# Patient Record
Sex: FEMALE | Race: BLACK OR AFRICAN AMERICAN | NOT HISPANIC OR LATINO | URBAN - METROPOLITAN AREA
[De-identification: names, ages, dates, MRNs, and addresses within clinical notes are randomized per-mention and may not be internally consistent; named-entity substitution may affect disease eponyms.]

---

## 2019-05-03 ENCOUNTER — INPATIENT (INPATIENT)
Facility: HOSPITAL | Age: 44
LOS: 1 days | Discharge: HOME | End: 2019-05-05
Attending: INTERNAL MEDICINE | Admitting: INTERNAL MEDICINE
Payer: COMMERCIAL

## 2019-05-03 VITALS
RESPIRATION RATE: 16 BRPM | SYSTOLIC BLOOD PRESSURE: 123 MMHG | HEART RATE: 73 BPM | DIASTOLIC BLOOD PRESSURE: 69 MMHG | TEMPERATURE: 98 F | OXYGEN SATURATION: 99 %

## 2019-05-03 DIAGNOSIS — Z09 ENCOUNTER FOR FOLLOW-UP EXAMINATION AFTER COMPLETED TREATMENT FOR CONDITIONS OTHER THAN MALIGNANT NEOPLASM: Chronic | ICD-10-CM

## 2019-05-03 DIAGNOSIS — F17.200 NICOTINE DEPENDENCE, UNSPECIFIED, UNCOMPLICATED: ICD-10-CM

## 2019-05-03 DIAGNOSIS — R07.9 CHEST PAIN, UNSPECIFIED: ICD-10-CM

## 2019-05-03 DIAGNOSIS — Z29.9 ENCOUNTER FOR PROPHYLACTIC MEASURES, UNSPECIFIED: ICD-10-CM

## 2019-05-03 DIAGNOSIS — Z98.89 OTHER SPECIFIED POSTPROCEDURAL STATES: Chronic | ICD-10-CM

## 2019-05-03 DIAGNOSIS — F31.9 BIPOLAR DISORDER, UNSPECIFIED: ICD-10-CM

## 2019-05-03 LAB
ALBUMIN SERPL ELPH-MCNC: 4.5 G/DL — SIGNIFICANT CHANGE UP (ref 3.3–5)
ALP SERPL-CCNC: 82 U/L — SIGNIFICANT CHANGE UP (ref 40–120)
ALT FLD-CCNC: 19 U/L — SIGNIFICANT CHANGE UP (ref 4–33)
ANION GAP SERPL CALC-SCNC: 14 MMO/L — SIGNIFICANT CHANGE UP (ref 7–14)
APTT BLD: 33.3 SEC — SIGNIFICANT CHANGE UP (ref 27.5–36.3)
AST SERPL-CCNC: 19 U/L — SIGNIFICANT CHANGE UP (ref 4–32)
BASOPHILS # BLD AUTO: 0.03 K/UL — SIGNIFICANT CHANGE UP (ref 0–0.2)
BASOPHILS NFR BLD AUTO: 0.4 % — SIGNIFICANT CHANGE UP (ref 0–2)
BILIRUB SERPL-MCNC: 0.3 MG/DL — SIGNIFICANT CHANGE UP (ref 0.2–1.2)
BUN SERPL-MCNC: 10 MG/DL — SIGNIFICANT CHANGE UP (ref 7–23)
CALCIUM SERPL-MCNC: 9.6 MG/DL — SIGNIFICANT CHANGE UP (ref 8.4–10.5)
CHLORIDE SERPL-SCNC: 102 MMOL/L — SIGNIFICANT CHANGE UP (ref 98–107)
CK MB BLD-MCNC: < 1 NG/ML — LOW (ref 1–4.7)
CK MB BLD-MCNC: SIGNIFICANT CHANGE UP (ref 0–2.5)
CK SERPL-CCNC: 90 U/L — SIGNIFICANT CHANGE UP (ref 25–170)
CO2 SERPL-SCNC: 23 MMOL/L — SIGNIFICANT CHANGE UP (ref 22–31)
CREAT SERPL-MCNC: 0.65 MG/DL — SIGNIFICANT CHANGE UP (ref 0.5–1.3)
EOSINOPHIL # BLD AUTO: 0.06 K/UL — SIGNIFICANT CHANGE UP (ref 0–0.5)
EOSINOPHIL NFR BLD AUTO: 0.7 % — SIGNIFICANT CHANGE UP (ref 0–6)
GLUCOSE SERPL-MCNC: 98 MG/DL — SIGNIFICANT CHANGE UP (ref 70–99)
HCT VFR BLD CALC: 36.4 % — SIGNIFICANT CHANGE UP (ref 34.5–45)
HGB BLD-MCNC: 12.3 G/DL — SIGNIFICANT CHANGE UP (ref 11.5–15.5)
IMM GRANULOCYTES NFR BLD AUTO: 0.2 % — SIGNIFICANT CHANGE UP (ref 0–1.5)
INR BLD: 1.05 — SIGNIFICANT CHANGE UP (ref 0.88–1.17)
LYMPHOCYTES # BLD AUTO: 2.18 K/UL — SIGNIFICANT CHANGE UP (ref 1–3.3)
LYMPHOCYTES # BLD AUTO: 26.6 % — SIGNIFICANT CHANGE UP (ref 13–44)
MAGNESIUM SERPL-MCNC: 1.9 MG/DL — SIGNIFICANT CHANGE UP (ref 1.6–2.6)
MCHC RBC-ENTMCNC: 30.8 PG — SIGNIFICANT CHANGE UP (ref 27–34)
MCHC RBC-ENTMCNC: 33.8 % — SIGNIFICANT CHANGE UP (ref 32–36)
MCV RBC AUTO: 91.2 FL — SIGNIFICANT CHANGE UP (ref 80–100)
MONOCYTES # BLD AUTO: 0.54 K/UL — SIGNIFICANT CHANGE UP (ref 0–0.9)
MONOCYTES NFR BLD AUTO: 6.6 % — SIGNIFICANT CHANGE UP (ref 2–14)
NEUTROPHILS # BLD AUTO: 5.38 K/UL — SIGNIFICANT CHANGE UP (ref 1.8–7.4)
NEUTROPHILS NFR BLD AUTO: 65.5 % — SIGNIFICANT CHANGE UP (ref 43–77)
NRBC # FLD: 0 K/UL — SIGNIFICANT CHANGE UP (ref 0–0)
PHOSPHATE SERPL-MCNC: 3.6 MG/DL — SIGNIFICANT CHANGE UP (ref 2.5–4.5)
PLATELET # BLD AUTO: 280 K/UL — SIGNIFICANT CHANGE UP (ref 150–400)
PMV BLD: 9.2 FL — SIGNIFICANT CHANGE UP (ref 7–13)
POTASSIUM SERPL-MCNC: 4.4 MMOL/L — SIGNIFICANT CHANGE UP (ref 3.5–5.3)
POTASSIUM SERPL-SCNC: 4.4 MMOL/L — SIGNIFICANT CHANGE UP (ref 3.5–5.3)
PROT SERPL-MCNC: 7.9 G/DL — SIGNIFICANT CHANGE UP (ref 6–8.3)
PROTHROM AB SERPL-ACNC: 11.7 SEC — SIGNIFICANT CHANGE UP (ref 9.8–13.1)
RBC # BLD: 3.99 M/UL — SIGNIFICANT CHANGE UP (ref 3.8–5.2)
RBC # FLD: 12.4 % — SIGNIFICANT CHANGE UP (ref 10.3–14.5)
SODIUM SERPL-SCNC: 139 MMOL/L — SIGNIFICANT CHANGE UP (ref 135–145)
TROPONIN T, HIGH SENSITIVITY: < 6 NG/L — SIGNIFICANT CHANGE UP (ref ?–14)
TROPONIN T, HIGH SENSITIVITY: < 6 NG/L — SIGNIFICANT CHANGE UP (ref ?–14)
TSH SERPL-MCNC: 0.91 UIU/ML — SIGNIFICANT CHANGE UP (ref 0.27–4.2)
WBC # BLD: 8.21 K/UL — SIGNIFICANT CHANGE UP (ref 3.8–10.5)
WBC # FLD AUTO: 8.21 K/UL — SIGNIFICANT CHANGE UP (ref 3.8–10.5)

## 2019-05-03 PROCEDURE — 71046 X-RAY EXAM CHEST 2 VIEWS: CPT | Mod: 26

## 2019-05-03 RX ORDER — ASPIRIN/CALCIUM CARB/MAGNESIUM 324 MG
162 TABLET ORAL ONCE
Qty: 0 | Refills: 0 | Status: COMPLETED | OUTPATIENT
Start: 2019-05-03 | End: 2019-05-03

## 2019-05-03 RX ORDER — ASPIRIN/CALCIUM CARB/MAGNESIUM 324 MG
81 TABLET ORAL DAILY
Qty: 0 | Refills: 0 | Status: DISCONTINUED | OUTPATIENT
Start: 2019-05-04 | End: 2019-05-05

## 2019-05-03 RX ORDER — SODIUM CHLORIDE 9 MG/ML
1000 INJECTION, SOLUTION INTRAVENOUS ONCE
Qty: 0 | Refills: 0 | Status: COMPLETED | OUTPATIENT
Start: 2019-05-03 | End: 2019-05-03

## 2019-05-03 RX ORDER — HEPARIN SODIUM 5000 [USP'U]/ML
5000 INJECTION INTRAVENOUS; SUBCUTANEOUS EVERY 12 HOURS
Qty: 0 | Refills: 0 | Status: DISCONTINUED | OUTPATIENT
Start: 2019-05-03 | End: 2019-05-05

## 2019-05-03 RX ORDER — LAMOTRIGINE 25 MG/1
125 TABLET, ORALLY DISINTEGRATING ORAL
Qty: 0 | Refills: 0 | Status: DISCONTINUED | OUTPATIENT
Start: 2019-05-03 | End: 2019-05-03

## 2019-05-03 RX ORDER — INFLUENZA VIRUS VACCINE 15; 15; 15; 15 UG/.5ML; UG/.5ML; UG/.5ML; UG/.5ML
0.5 SUSPENSION INTRAMUSCULAR ONCE
Qty: 0 | Refills: 0 | Status: DISCONTINUED | OUTPATIENT
Start: 2019-05-03 | End: 2019-05-05

## 2019-05-03 RX ORDER — SODIUM CHLORIDE 9 MG/ML
3 INJECTION INTRAMUSCULAR; INTRAVENOUS; SUBCUTANEOUS EVERY 8 HOURS
Qty: 0 | Refills: 0 | Status: DISCONTINUED | OUTPATIENT
Start: 2019-05-03 | End: 2019-05-05

## 2019-05-03 RX ORDER — LAMOTRIGINE 25 MG/1
250 TABLET, ORALLY DISINTEGRATING ORAL AT BEDTIME
Qty: 0 | Refills: 0 | Status: DISCONTINUED | OUTPATIENT
Start: 2019-05-03 | End: 2019-05-05

## 2019-05-03 RX ADMIN — SODIUM CHLORIDE 3 MILLILITER(S): 9 INJECTION INTRAMUSCULAR; INTRAVENOUS; SUBCUTANEOUS at 21:40

## 2019-05-03 RX ADMIN — Medication 162 MILLIGRAM(S): at 16:03

## 2019-05-03 RX ADMIN — SODIUM CHLORIDE 1000 MILLILITER(S): 9 INJECTION, SOLUTION INTRAVENOUS at 16:03

## 2019-05-03 NOTE — ED ADULT NURSE NOTE - NSIMPLEMENTINTERV_GEN_ALL_ED
Implemented All Fall Risk Interventions:  [place] to call system. Call bell, personal items and telephone within reach. Instruct patient to call for assistance. Room bathroom lighting operational. Non-slip footwear when patient is off stretcher. Physically safe environment: no spills, clutter or unnecessary equipment. Stretcher in lowest position, wheels locked, appropriate side rails in place. Provide visual cue, wrist band, yellow gown, etc. Monitor gait and stability. Monitor for mental status changes and reorient to person, place, and time. Review medications for side effects contributing to fall risk. Reinforce activity limits and safety measures with patient and family.

## 2019-05-03 NOTE — ED PROVIDER NOTE - CLINICAL SUMMARY MEDICAL DECISION MAKING FREE TEXT BOX
43F w/ hx fibroids s/p hysterectomy, bipolar d/o on lamotrigine presents with intermittent left sided chest pain for the last two days. Pre-troponin HEART score 2. Concerning given concomittent presyncope symptoms. Likely dehydration element in this case. Will get cbc, cmp, coags, ekg, cxr, troponin. Will give ASA, Fluids. reassess. may need cdu for stress/echo. Naz att: 43F w/ hx fibroids s/p hysterectomy, bipolar d/o on lamotrigine presents with intermittent left sided chest pain for the last two days. Pre-troponin HEART score 2. Concerning given concomittent presyncope symptoms. Likely dehydration element in this case. Will get cbc, cmp, coags, ekg, cxr, troponin. Will give ASA, Fluids. reassess. may need cdu for stress/echo.

## 2019-05-03 NOTE — ED ADULT NURSE REASSESSMENT NOTE - NS ED NURSE REASSESS COMMENT FT1
Report received from Intake REBECA CREWS Pt A&Ox4, ambulatory, IV intact. Belongings with patient. EKG in chart. Report given to REBECA Schreiber - pt is a CDU boarder at this time.

## 2019-05-03 NOTE — H&P ADULT - PROBLEM SELECTOR PLAN 2
Continue Lamictal. Pt advised of the hazards of continue smoking Nicotine and benefits of quitting.  Pt currently does not want Nicotine supplementing.

## 2019-05-03 NOTE — H&P ADULT - NSHPRISKHIVSCREEN_GEN_ALL_CORE
Offered and patient declined Abdomen soft, nontender, nondistended, bowel sounds present in all 4 quadrants.

## 2019-05-03 NOTE — H&P ADULT - NEUROLOGICAL DETAILS
alert and oriented x 3/responds to verbal commands/no spontaneous movement/normal strength/sensation intact

## 2019-05-03 NOTE — ED PROVIDER NOTE - NS ED ROS FT
Denies fevers, chills, nausea, vomiting, HA, blurry vision, neck pain, back pain, abd pain, diarrhea, constipation, dysuria, hematuria.    [ x] All other systems negative  [ ] Unable to assess ROS because ________.

## 2019-05-03 NOTE — H&P ADULT - RS GEN PE MLT RESP DETAILS PC
airway patent/breath sounds equal/no rales/good air movement/respirations non-labored/no rhonchi/no wheezes/no intercostal retractions/clear to auscultation bilaterally/no chest wall tenderness/no subcutaneous emphysema

## 2019-05-03 NOTE — ED PROVIDER NOTE - PHYSICAL EXAMINATION
General: WN/WD NAD  HEENT: PERRLA, EOMI, moist mucous membranes  Neurology: A&Ox3, nonfocal, NAVARRO x 4  Respiratory: CTA B/L, normal respiratory effort, no wheezes, crackles, rales  CV: RRR, S1S2, no murmurs, rubs or gallops  Abdominal: Soft, NT, ND +BS, Last BM  Extremities: No edema, + peripheral pulses  Incisions: none  Tubes: none

## 2019-05-03 NOTE — H&P ADULT - NSHPLABSRESULTS_GEN_ALL_CORE
H &H = 12.3/ 36.4  PT/ INR / PTT= 11.7/ 1.05/ 33.3  TSH= 0.91  BUN/ Creatinine= 10/ 0.65  Glucose = 98  Trop < 6 , 6  CXR Preliminary = Clear   TSH = 0.91  UCG= Negative  EKG NSR @ 68b/ min ,QT/ QTC= 422/ 448.

## 2019-05-03 NOTE — ED ADULT NURSE NOTE - OBJECTIVE STATEMENT
Pt presenting to intake AxOx4, ambulatory at baseline with c/o left sided intermittent stabbing chest pain x1 day. Pt states she had an episode this AM where she felt like she was going to pass out. Pt denies LOC or falling. On arrival to ED pt asymptomatic. Breathing is even and unlabored, pt satting well on RA. IV established with 20g in LAC, labs drawn and sent, pt on cardiac monitor- NSR @75. MD at bedside, will continue to monitor.

## 2019-05-03 NOTE — H&P ADULT - NEGATIVE OPHTHALMOLOGIC SYMPTOMS
no discharge L/no photophobia/no lacrimation L/no lacrimation R/no blurred vision L/no blurred vision R

## 2019-05-03 NOTE — H&P ADULT - HISTORY OF PRESENT ILLNESS
43F with hx of fibroids s/p hysterectomy, bipolar d/o on lamotrigine presents with intermittent left sided chest pain for the last two days. Pt noticed the pain first on 5/1/19 evening, has been coming and going since then, non exertional, radiates to the L jaw and down the L arm. L arm also with numbness and tingling. The patient also noted today with the last episode that she felt presyncopal and almost collapsed. Never actually had LOC. Denies fevers, chills, nausea, vomiting, HA, blurry vision, neck pain, back pain, abd pain, diarrhea, constipation, dysuria, hematuria.

## 2019-05-03 NOTE — H&P ADULT - NEGATIVE NEUROLOGICAL SYMPTOMS
no transient paralysis/no headache/no facial palsy/no loss of sensation/no focal seizures/no difficulty walking/no loss of consciousness/no hemiparesis/no paresthesias/no confusion/no weakness/no syncope/no tremors/no generalized seizures

## 2019-05-03 NOTE — H&P ADULT - NSICDXPASTMEDICALHX_GEN_ALL_CORE_FT
PAST MEDICAL HISTORY:  Bipolar illness     Fibroids PAST MEDICAL HISTORY:  Bipolar illness     Fibroids     Nicotine addiction

## 2019-05-03 NOTE — ED PROVIDER NOTE - PROGRESS NOTE DETAILS
Alonso CRUM: Labs reassuring. First trop neg. CXR clear. Given syncope with chest pain will need stress and echo. Stress lab not open tomorrow so cannot CDU. D/w tele doc Dr. Ramos who agrees with admission.

## 2019-05-03 NOTE — H&P ADULT - NEGATIVE ENMT SYMPTOMS
no sinus symptoms/no tinnitus/no nasal discharge/no ear pain/no post-nasal discharge/no nasal congestion/no nasal obstruction/no hearing difficulty

## 2019-05-03 NOTE — ED ADULT TRIAGE NOTE - CHIEF COMPLAINT QUOTE
Patient arrives with ems for c/o left sided chest pain on and off since yesterday. Today while at work she got the chest pain and " felt like I was going to pass out" , also has tingling in her left hand. No chest pain at time of triage.

## 2019-05-03 NOTE — ED PROVIDER NOTE - OBJECTIVE STATEMENT
43F w/ hx fibroids s/p hysterectomy, bipolar d/o on lamotrigine presents with intermittent left sided chest pain for the last two days. Pt noticed the pain first on wednesday night. Has been coming and going since then. non exertional. radiates to the L jaw and down the L arm. L arm also with numbness and tingling. The patient also noted today with the last episode that she felt presyncopal and almost collapsed. Never actually had LOC. Denies fevers, chills, nausea, vomiting, HA, blurry vision, neck pain, back pain, abd pain, diarrhea, constipation, dysuria, hematuria. No fam hx of early cardiac death or stroke. Pt is an every day smoker (1-2 cigarettes). Not on OCPs (s/p hysterectomy).

## 2019-05-04 LAB
ANION GAP SERPL CALC-SCNC: 14 MMO/L — SIGNIFICANT CHANGE UP (ref 7–14)
BUN SERPL-MCNC: 14 MG/DL — SIGNIFICANT CHANGE UP (ref 7–23)
CALCIUM SERPL-MCNC: 9 MG/DL — SIGNIFICANT CHANGE UP (ref 8.4–10.5)
CHLORIDE SERPL-SCNC: 103 MMOL/L — SIGNIFICANT CHANGE UP (ref 98–107)
CHOLEST SERPL-MCNC: 191 MG/DL — SIGNIFICANT CHANGE UP (ref 120–199)
CO2 SERPL-SCNC: 22 MMOL/L — SIGNIFICANT CHANGE UP (ref 22–31)
CREAT SERPL-MCNC: 0.69 MG/DL — SIGNIFICANT CHANGE UP (ref 0.5–1.3)
GLUCOSE SERPL-MCNC: 108 MG/DL — HIGH (ref 70–99)
HCT VFR BLD CALC: 34 % — LOW (ref 34.5–45)
HDLC SERPL-MCNC: 59 MG/DL — SIGNIFICANT CHANGE UP (ref 45–65)
HGB BLD-MCNC: 11.2 G/DL — LOW (ref 11.5–15.5)
LIPID PNL WITH DIRECT LDL SERPL: 132 MG/DL — SIGNIFICANT CHANGE UP
MAGNESIUM SERPL-MCNC: 1.9 MG/DL — SIGNIFICANT CHANGE UP (ref 1.6–2.6)
MCHC RBC-ENTMCNC: 30.8 PG — SIGNIFICANT CHANGE UP (ref 27–34)
MCHC RBC-ENTMCNC: 32.9 % — SIGNIFICANT CHANGE UP (ref 32–36)
MCV RBC AUTO: 93.4 FL — SIGNIFICANT CHANGE UP (ref 80–100)
NRBC # FLD: 0 K/UL — SIGNIFICANT CHANGE UP (ref 0–0)
PHOSPHATE SERPL-MCNC: 4.4 MG/DL — SIGNIFICANT CHANGE UP (ref 2.5–4.5)
PLATELET # BLD AUTO: 247 K/UL — SIGNIFICANT CHANGE UP (ref 150–400)
PMV BLD: 9.4 FL — SIGNIFICANT CHANGE UP (ref 7–13)
POTASSIUM SERPL-MCNC: 4 MMOL/L — SIGNIFICANT CHANGE UP (ref 3.5–5.3)
POTASSIUM SERPL-SCNC: 4 MMOL/L — SIGNIFICANT CHANGE UP (ref 3.5–5.3)
RBC # BLD: 3.64 M/UL — LOW (ref 3.8–5.2)
RBC # FLD: 12.4 % — SIGNIFICANT CHANGE UP (ref 10.3–14.5)
SODIUM SERPL-SCNC: 139 MMOL/L — SIGNIFICANT CHANGE UP (ref 135–145)
TRIGL SERPL-MCNC: 74 MG/DL — SIGNIFICANT CHANGE UP (ref 10–149)
WBC # BLD: 6.16 K/UL — SIGNIFICANT CHANGE UP (ref 3.8–10.5)
WBC # FLD AUTO: 6.16 K/UL — SIGNIFICANT CHANGE UP (ref 3.8–10.5)

## 2019-05-04 PROCEDURE — 93010 ELECTROCARDIOGRAM REPORT: CPT

## 2019-05-04 RX ADMIN — SODIUM CHLORIDE 3 MILLILITER(S): 9 INJECTION INTRAMUSCULAR; INTRAVENOUS; SUBCUTANEOUS at 05:58

## 2019-05-04 RX ADMIN — Medication 81 MILLIGRAM(S): at 12:30

## 2019-05-04 RX ADMIN — HEPARIN SODIUM 5000 UNIT(S): 5000 INJECTION INTRAVENOUS; SUBCUTANEOUS at 05:59

## 2019-05-04 RX ADMIN — SODIUM CHLORIDE 3 MILLILITER(S): 9 INJECTION INTRAMUSCULAR; INTRAVENOUS; SUBCUTANEOUS at 12:29

## 2019-05-04 RX ADMIN — SODIUM CHLORIDE 3 MILLILITER(S): 9 INJECTION INTRAMUSCULAR; INTRAVENOUS; SUBCUTANEOUS at 23:06

## 2019-05-04 RX ADMIN — LAMOTRIGINE 250 MILLIGRAM(S): 25 TABLET, ORALLY DISINTEGRATING ORAL at 23:05

## 2019-05-04 RX ADMIN — HEPARIN SODIUM 5000 UNIT(S): 5000 INJECTION INTRAVENOUS; SUBCUTANEOUS at 17:22

## 2019-05-04 RX ADMIN — LAMOTRIGINE 250 MILLIGRAM(S): 25 TABLET, ORALLY DISINTEGRATING ORAL at 00:15

## 2019-05-04 NOTE — CONSULT NOTE ADULT - ASSESSMENT
44 y/o female  withhx tobacco use ohx fibroids  s/p hysterectomy, bipolar d/o admitted with chest pain associated with dizziness and near syncope/l arm pain     . Atypical CP  ECHO  -pending Exercise Nuclear Stress r/o ischemic heart disease   -asa  -smoking cessation      2.  Bipolar D/O  lamoTRIgine to continue     dvt ppx

## 2019-05-04 NOTE — CHART NOTE - NSCHARTNOTEFT_GEN_A_CORE
Called for 43 year old complaining of having an episode of chest pain lasting only a few seconds. Chest pain is described as sharp and states began with deep breathing. Pt states pain was confined to the L side of her chest; no radiation. States no SOB, no N/V, no sweating, and no other symptoms. Admits to a high stress job however no stress more than usual. She is currently asymptomatic.   Vital Signs Last 24 Hrs  T(C): 36.6 (04 May 2019 21:09), Max: 36.8 (04 May 2019 05:57)  T(F): 97.9 (04 May 2019 21:09), Max: 98.2 (04 May 2019 05:57)  HR: 72 (04 May 2019 21:09) (62 - 76)  BP: 115/68 (04 May 2019 21:09) (100/64 - 116/75)  BP(mean): --  RR: 17 (04 May 2019 21:09) (15 - 17)  SpO2: 99% (04 May 2019 21:09) (99% - 100%)  Pt AOx3.   Lungs CTAB; no wheeze, rales, or rhonchi.   Cardiac S1 S2. RRR  No neuro deficits    MEDICATIONS  (STANDING):  aspirin enteric coated 81 milliGRAM(s) Oral daily  heparin  Injectable 5000 Unit(s) SubCutaneous every 12 hours  influenza   Vaccine 0.5 milliLiter(s) IntraMuscular once  lamoTRIgine  milliGRAM(s) Oral at bedtime  sodium chloride 0.9% lock flush 3 milliLiter(s) IV Push every 8 hours      EKG NSR no ectopy  CARDIAC MARKERS ( 03 May 2019 20:30 )  x     / x     / 90 u/L / < 1.00 ng/mL / x        A/P Episode of sharp chest pain; asymptomatic  Pt will have stress and TTE in am  Will continue to monitor

## 2019-05-04 NOTE — PROGRESS NOTE ADULT - ATTENDING COMMENTS
Patient seen and examined.  Agree with above NP note.  cv stable  no further chest pain  await stress and echo   d/c pending above  med eval for bipolar management   dvt ppx

## 2019-05-04 NOTE — CONSULT NOTE ADULT - SUBJECTIVE AND OBJECTIVE BOX
Patient is a 43y old  Female who presents with a chief complaint of Chest pain x 2 days (04 May 2019 11:09)      INTERVAL HPI/OVERNIGHT EVENTS:    Medications:MEDICATIONS  (STANDING):  aspirin enteric coated 81 milliGRAM(s) Oral daily  heparin  Injectable 5000 Unit(s) SubCutaneous every 12 hours  influenza   Vaccine 0.5 milliLiter(s) IntraMuscular once  lamoTRIgine  milliGRAM(s) Oral at bedtime  sodium chloride 0.9% lock flush 3 milliLiter(s) IV Push every 8 hours    MEDICATIONS  (PRN):      Allergies: Allergies    No Known Allergies    Intolerances          FAMILY HISTORY:  FH: hyperlipidemia  FH: hypertension        PAST MEDICAL & SURGICAL HISTORY:  Nicotine addiction  Bipolar illness  Fibroids  S/P neck surgery, follow-up exam: lump removed  S/P appendectomy      REVIEW OF SYSTEMS:  CONSTITUTIONAL: No fever, weight loss, or fatigue  EYES: No eye pain, visual disturbances, or discharge  ENMT:  No difficulty hearing, tinnitus, vertigo; No sinus or throat pain  NECK: No pain or stiffness  BREASTS: No pain, masses, or nipple discharge  RESPIRATORY: No cough, wheezing, chills or hemoptysis; No shortness of breath  CARDIOVASCULAR: No chest pain, palpitations, dizziness, or leg swelling  GASTROINTESTINAL: No abdominal or epigastric pain. No nausea, vomiting, or hematemesis; No diarrhea or constipation. No melena or hematochezia.  GENITOURINARY: No dysuria, frequency, hematuria, or incontinence  NEUROLOGICAL: No headaches, memory loss, loss of strength, numbness, or tremors  SKIN: No itching, burning, rashes, or lesions   LYMPH NODES: No enlarged glands  ENDOCRINE: No heat or cold intolerance; No hair loss  MUSCULOSKELETAL: No joint pain or swelling; No muscle, back, or extremity pain  PSYCHIATRIC: No depression, anxiety, mood swings, or difficulty sleeping  HEME/LYMPH: No easy bruising, or bleeding gums  ALLERY AND IMMUNOLOGIC: No hives or eczema    T(C): 36.8 (05-04-19 @ 05:57), Max: 36.8 (05-04-19 @ 05:57)  HR: 62 (05-04-19 @ 05:57) (62 - 78)  BP: 105/64 (05-04-19 @ 05:57) (105/64 - 128/65)  RR: 16 (05-04-19 @ 05:57) (14 - 16)  SpO2: 100% (05-04-19 @ 05:57) (98% - 100%)  Wt(kg): --Vital Signs Last 24 Hrs  T(C): 36.8 (04 May 2019 05:57), Max: 36.8 (04 May 2019 05:57)  T(F): 98.2 (04 May 2019 05:57), Max: 98.2 (04 May 2019 05:57)  HR: 62 (04 May 2019 05:57) (62 - 78)  BP: 105/64 (04 May 2019 05:57) (105/64 - 128/65)  BP(mean): --  RR: 16 (04 May 2019 05:57) (14 - 16)  SpO2: 100% (04 May 2019 05:57) (98% - 100%)  I&O's Summary      PHYSICAL EXAM:  GENERAL: NAD, well-groomed, well-developed  HEAD:  Atraumatic, Normocephalic  EYES: EOMI, PERRLA, conjunctiva and sclera clear  ENMT: No tonsillar erythema, exudates, or enlargement; Moist mucous membranes, Good dentition, No lesions  NECK: Supple, No JVD, Normal thyroid  NERVOUS SYSTEM:  Alert & Oriented X3, Good concentration; Motor Strength 5/5 B/L upper and lower extremities; DTRs 2+ intact and symmetric  CHEST/LUNG: Clear to percussion bilaterally; No rales, rhonchi, wheezing, or rubs  HEART: Regular rate and rhythm; No murmurs, rubs, or gallops  ABDOMEN: Soft, Nontender, Nondistended; Bowel sounds present  EXTREMITIES:  2+ Peripheral Pulses, No clubbing, cyanosis, or edema  LYMPH: No lymphadenopathy noted  SKIN: No rashes or lesions    Consultant(s) Notes Reviewed:  [x ] YES  [ ] NO  Care Discussed with Consultants/Other Providerscpk [ x] YES  [ ] NO    LABS:                    CBC Full  -  ( 04 May 2019 06:20 )  WBC Count : 6.16 K/uL  RBC Count : 3.64 M/uL  Hemoglobin : 11.2 g/dL  Hematocrit : 34.0 %  Platelet Count - Automated : 247 K/uL  Mean Cell Volume : 93.4 fL  Mean Cell Hemoglobin : 30.8 pg  Mean Cell Hemoglobin Concentration : 32.9 %  Auto Neutrophil # : x  Auto Lymphocyte # : x  Auto Monocyte # : x  Auto Eosinophil # : x  Auto Basophil # : x  Auto Neutrophil % : x  Auto Lymphocyte % : x  Auto Monocyte % : x  Auto Eosinophil % : x  Auto Basophil % : x      05-04    139  |  103  |  14  ----------------------------<  108<H>  4.0   |  22  |  0.69    Ca    9.0      04 May 2019 06:20  Phos  4.4     05-04  Mg     1.9     05-04    TPro  7.9  /  Alb  4.5  /  TBili  0.3  /  DBili  x   /  AST  19  /  ALT  19  /  AlkPhos  82  05-03          PT/INR - ( 03 May 2019 15:59 )   PT: 11.7 SEC;   INR: 1.05          PTT - ( 03 May 2019 15:59 )  PTT:33.3 SEC  RADIOLOGY & ADDITIONAL TESTS:    Imaging Personally Reviewed:  [ ] YES  [ ] NO Patient is a 43y old  Female who presents with a chief complaint of Chest pain x 2 days (04 May 2019 11:09)    hpi reviewed      INTERVAL HPI/OVERNIGHT EVENTS:    Medications:MEDICATIONS  (STANDING):  aspirin enteric coated 81 milliGRAM(s) Oral daily  heparin  Injectable 5000 Unit(s) SubCutaneous every 12 hours  influenza   Vaccine 0.5 milliLiter(s) IntraMuscular once  lamoTRIgine  milliGRAM(s) Oral at bedtime  sodium chloride 0.9% lock flush 3 milliLiter(s) IV Push every 8 hours    MEDICATIONS  (PRN):      Allergies: Allergies    No Known Allergies    Intolerances          FAMILY HISTORY:  FH: hyperlipidemia  FH: hypertension        PAST MEDICAL & SURGICAL HISTORY:  Nicotine addiction  Bipolar illness  Fibroids  S/P neck surgery, follow-up exam: lump removed  S/P appendectomy      REVIEW OF SYSTEMS:  CONSTITUTIONAL: No fever, weight loss, or fatigue  EYES: No eye pain, visual disturbances, or discharge  ENMT:  No difficulty hearing, tinnitus, vertigo; No sinus or throat pain  NECK: No pain or stiffness    RESPIRATORY: No cough, wheezing, chills or hemoptysis; No shortness of breath  CARDIOVASCULAR: chest pain / la rm pain   GASTROINTESTINAL: No abdominal or epigastric pain. No nausea, vomiting, or hematemesis; No diarrhea or constipation. No melena or hematochezia.  GENITOURINARY: No dysuria, frequency, hematuria, or incontinence  NEUROLOGICAL: No headaches, memory loss, loss of strength,   MUSCULOSKELETAL: No joint pain or swelling; No muscle, back, or extremity pain      T(C): 36.8 (05-04-19 @ 05:57), Max: 36.8 (05-04-19 @ 05:57)  HR: 62 (05-04-19 @ 05:57) (62 - 78)  BP: 105/64 (05-04-19 @ 05:57) (105/64 - 128/65)  RR: 16 (05-04-19 @ 05:57) (14 - 16)  SpO2: 100% (05-04-19 @ 05:57) (98% - 100%)  Wt(kg): --Vital Signs Last 24 Hrs  T(C): 36.8 (04 May 2019 05:57), Max: 36.8 (04 May 2019 05:57)  T(F): 98.2 (04 May 2019 05:57), Max: 98.2 (04 May 2019 05:57)  HR: 62 (04 May 2019 05:57) (62 - 78)  BP: 105/64 (04 May 2019 05:57) (105/64 - 128/65)  BP(mean): --  RR: 16 (04 May 2019 05:57) (14 - 16)  SpO2: 100% (04 May 2019 05:57) (98% - 100%)  I&O's Summary      PHYSICAL EXAM:  GENERAL: NAD, well-groomed, well-developed  HEAD:  Atraumatic, Normocephalic  EYES: EOMI, PERRLA, conjunctiva and sclera clear  ENMT: No tonsillar erythema, exudates, or enlargement; Moist mucous membranes, Good dentition, No lesions  NECK: Supple, No JVD, Normal thyroid  NERVOUS SYSTEM:  Alert & Oriented X3, Good concentration; Motor Strength 5/5 B/L upper and lower extremities; DTRs 2+ intact and symmetric  CHEST/LUNG: Clear to percussion bilaterally; No rales, rhonchi, wheezing, or rubs  HEART: Regular rate and rhythm; No murmurs, rubs, or gallops  ABDOMEN: Soft, Nontender, Nondistended; Bowel sounds present  EXTREMITIES:  2+ Peripheral Pulses, No clubbing, cyanosis, or edema  LYMPH: No lymphadenopathy noted  SKIN: No rashes or lesions    Consultant(s) Notes Reviewed:  [x ] YES  [ ] NO  Care Discussed with Consultants/Other Providerscpk [ x] YES  [ ] NO    LABS:                    CBC Full  -  ( 04 May 2019 06:20 )  WBC Count : 6.16 K/uL  RBC Count : 3.64 M/uL  Hemoglobin : 11.2 g/dL  Hematocrit : 34.0 %  Platelet Count - Automated : 247 K/uL  Mean Cell Volume : 93.4 fL  Mean Cell Hemoglobin : 30.8 pg  Mean Cell Hemoglobin Concentration : 32.9 %  Auto Neutrophil # : x  Auto Lymphocyte # : x  Auto Monocyte # : x  Auto Eosinophil # : x  Auto Basophil # : x  Auto Neutrophil % : x  Auto Lymphocyte % : x  Auto Monocyte % : x  Auto Eosinophil % : x  Auto Basophil % : x      05-04    139  |  103  |  14  ----------------------------<  108<H>  4.0   |  22  |  0.69    Ca    9.0      04 May 2019 06:20  Phos  4.4     05-04  Mg     1.9     05-04    TPro  7.9  /  Alb  4.5  /  TBili  0.3  /  DBili  x   /  AST  19  /  ALT  19  /  AlkPhos  82  05-03          PT/INR - ( 03 May 2019 15:59 )   PT: 11.7 SEC;   INR: 1.05          PTT - ( 03 May 2019 15:59 )  PTT:33.3 SEC  RADIOLOGY & ADDITIONAL TESTS:    Imaging Personally Reviewed:  [ ] YES  [ ] NO

## 2019-05-05 ENCOUNTER — TRANSCRIPTION ENCOUNTER (OUTPATIENT)
Age: 44
End: 2019-05-05

## 2019-05-05 VITALS
HEART RATE: 66 BPM | SYSTOLIC BLOOD PRESSURE: 109 MMHG | RESPIRATION RATE: 18 BRPM | TEMPERATURE: 98 F | DIASTOLIC BLOOD PRESSURE: 67 MMHG | OXYGEN SATURATION: 99 %

## 2019-05-05 LAB
ANION GAP SERPL CALC-SCNC: 10 MMO/L — SIGNIFICANT CHANGE UP (ref 7–14)
BUN SERPL-MCNC: 12 MG/DL — SIGNIFICANT CHANGE UP (ref 7–23)
CALCIUM SERPL-MCNC: 9.8 MG/DL — SIGNIFICANT CHANGE UP (ref 8.4–10.5)
CHLORIDE SERPL-SCNC: 104 MMOL/L — SIGNIFICANT CHANGE UP (ref 98–107)
CO2 SERPL-SCNC: 25 MMOL/L — SIGNIFICANT CHANGE UP (ref 22–31)
CREAT SERPL-MCNC: 0.77 MG/DL — SIGNIFICANT CHANGE UP (ref 0.5–1.3)
GLUCOSE SERPL-MCNC: 112 MG/DL — HIGH (ref 70–99)
HCT VFR BLD CALC: 34.3 % — LOW (ref 34.5–45)
HGB BLD-MCNC: 11.5 G/DL — SIGNIFICANT CHANGE UP (ref 11.5–15.5)
MAGNESIUM SERPL-MCNC: 1.9 MG/DL — SIGNIFICANT CHANGE UP (ref 1.6–2.6)
MCHC RBC-ENTMCNC: 30.7 PG — SIGNIFICANT CHANGE UP (ref 27–34)
MCHC RBC-ENTMCNC: 33.5 % — SIGNIFICANT CHANGE UP (ref 32–36)
MCV RBC AUTO: 91.5 FL — SIGNIFICANT CHANGE UP (ref 80–100)
NRBC # FLD: 0 K/UL — SIGNIFICANT CHANGE UP (ref 0–0)
PHOSPHATE SERPL-MCNC: 4.2 MG/DL — SIGNIFICANT CHANGE UP (ref 2.5–4.5)
PLATELET # BLD AUTO: 262 K/UL — SIGNIFICANT CHANGE UP (ref 150–400)
PMV BLD: 9.4 FL — SIGNIFICANT CHANGE UP (ref 7–13)
POTASSIUM SERPL-MCNC: 4 MMOL/L — SIGNIFICANT CHANGE UP (ref 3.5–5.3)
POTASSIUM SERPL-SCNC: 4 MMOL/L — SIGNIFICANT CHANGE UP (ref 3.5–5.3)
RBC # BLD: 3.75 M/UL — LOW (ref 3.8–5.2)
RBC # FLD: 12.4 % — SIGNIFICANT CHANGE UP (ref 10.3–14.5)
SODIUM SERPL-SCNC: 139 MMOL/L — SIGNIFICANT CHANGE UP (ref 135–145)
WBC # BLD: 6.48 K/UL — SIGNIFICANT CHANGE UP (ref 3.8–10.5)
WBC # FLD AUTO: 6.48 K/UL — SIGNIFICANT CHANGE UP (ref 3.8–10.5)

## 2019-05-05 PROCEDURE — 93018 CV STRESS TEST I&R ONLY: CPT | Mod: GC

## 2019-05-05 PROCEDURE — 93306 TTE W/DOPPLER COMPLETE: CPT | Mod: 26

## 2019-05-05 PROCEDURE — 93016 CV STRESS TEST SUPVJ ONLY: CPT | Mod: GC

## 2019-05-05 PROCEDURE — 78452 HT MUSCLE IMAGE SPECT MULT: CPT | Mod: 26

## 2019-05-05 RX ORDER — ASPIRIN/CALCIUM CARB/MAGNESIUM 324 MG
1 TABLET ORAL
Qty: 0 | Refills: 0 | COMMUNITY
Start: 2019-05-05

## 2019-05-05 RX ADMIN — SODIUM CHLORIDE 3 MILLILITER(S): 9 INJECTION INTRAMUSCULAR; INTRAVENOUS; SUBCUTANEOUS at 06:20

## 2019-05-05 RX ADMIN — HEPARIN SODIUM 5000 UNIT(S): 5000 INJECTION INTRAVENOUS; SUBCUTANEOUS at 06:20

## 2019-05-05 RX ADMIN — Medication 81 MILLIGRAM(S): at 13:27

## 2019-05-05 RX ADMIN — SODIUM CHLORIDE 3 MILLILITER(S): 9 INJECTION INTRAMUSCULAR; INTRAVENOUS; SUBCUTANEOUS at 12:00

## 2019-05-05 NOTE — PROGRESS NOTE ADULT - ASSESSMENT
1. Atypical CP  2. Smoker  3. Bipolar D/O    -ECHO  -Exercise Nuclear Stress  -asa  -smoking cessation
43F with + smoker hx of fibroids s/p hysterectomy, bipolar d/o admitted with chest pain associated with dizziness and near syncope    1. Atypical CP  - no evidence of ACS, chest xray unremarkable   -pending ECHO  -pending Exercise Nuclear Stress r/o ischemic heart disease   -asa  -smoking cessation      2.  Bipolar D/O  lamoTRIgine as ordered     dvt ppx
43F with + smoker hx of fibroids s/p hysterectomy, bipolar d/o admitted with chest pain associated with dizziness and near syncope    1. Atypical CP  - no evidence of ACS, chest xray unremarkable   -pending ECHO  -pending Exercise Nuclear Stress r/o ischemic heart disease   -asa  -smoking cessation      2.  Bipolar D/O  lamoTRIgine as ordered     dvt ppx     if stress negative can be discharged w outpt followup
44 y/o female  withhx tobacco use ohx fibroids  s/p hysterectomy, bipolar d/o admitted with chest pain associated with dizziness and near syncope/l arm pain     . Atypical CP  ECHO  -pending Exercise Nuclear Stress r/o ischemic heart disease   -asa  -smoking cessation      2.  Bipolar D/O  lamoTRIgine to continue     dvt ppx

## 2019-05-05 NOTE — PROGRESS NOTE ADULT - SUBJECTIVE AND OBJECTIVE BOX
CC: pt seen and examined, full H and P to follow. 44 yo woman, smoker, h/o Bipolar p/w two days of recurrent sharp, spasm-like substernal chest pain associated with dizziness and near syncope.   no current pain, dyspnea    TELEMETRY: NSR    PHYSICAL EXAM:    T(C): 36.7 (05-03-19 @ 13:46), Max: 36.7 (05-03-19 @ 13:46)  HR: 75 (05-03-19 @ 16:00) (73 - 75)  BP: 128/65 (05-03-19 @ 16:00) (123/69 - 128/65)  RR: 16 (05-03-19 @ 16:00) (16 - 16)  SpO2: 100% (05-03-19 @ 16:00) (99% - 100%)  Wt(kg): --  I&O's Summary      Appearance: Normal	  Cardiovascular: Normal S1 S2,RRR, No JVD, No murmurs  Respiratory: Lungs clear to auscultation	  Gastrointestinal:  Soft, Non-tender, + BS	  Extremities: Normal range of motion, No clubbing, cyanosis or edema  Vascular: Peripheral pulses palpable 2+ bilaterally     LABS:	 	                          12.3   8.21  )-----------( 280      ( 03 May 2019 15:59 )             36.4     05-03    139  |  102  |  10  ----------------------------<  98  4.4   |  23  |  0.65    Ca    9.6      03 May 2019 15:59  Phos  3.6     05-03  Mg     1.9     05-03    TPro  7.9  /  Alb  4.5  /  TBili  0.3  /  DBili  x   /  AST  19  /  ALT  19  /  AlkPhos  82  05-03      PT/INR - ( 03 May 2019 15:59 )   PT: 11.7 SEC;   INR: 1.05          PTT - ( 03 May 2019 15:59 )  PTT:33.3 SEC    CARDIAC MARKERS:    ECG: NSR
CARDIOLOGY FOLLOW UP - Dr. Ramos    CC no cp or sob       PHYSICAL EXAM:  T(C): 36.8 (05-04-19 @ 05:57), Max: 36.8 (05-04-19 @ 05:57)  HR: 62 (05-04-19 @ 05:57) (62 - 78)  BP: 105/64 (05-04-19 @ 05:57) (105/64 - 128/65)  RR: 16 (05-04-19 @ 05:57) (14 - 16)  SpO2: 100% (05-04-19 @ 05:57) (98% - 100%)  Wt(kg): --  I&O's Summary      Appearance: Normal	  Cardiovascular: Normal S1 S2,RRR, No JVD, No murmurs  Respiratory: Lungs clear to auscultation	  Gastrointestinal:  Soft, Non-tender, + BS	  Extremities: Normal range of motion, No clubbing, cyanosis or edema        MEDICATIONS  (STANDING):  aspirin enteric coated 81 milliGRAM(s) Oral daily  heparin  Injectable 5000 Unit(s) SubCutaneous every 12 hours  influenza   Vaccine 0.5 milliLiter(s) IntraMuscular once  lamoTRIgine  milliGRAM(s) Oral at bedtime  sodium chloride 0.9% lock flush 3 milliLiter(s) IV Push every 8 hours      TELEMETRY: NSR 	    ECG:  	  RADIOLOGY:   DIAGNOSTIC TESTING:  [ ] Echocardiogram:  [ ]  Catheterization:  [ ] Stress Test:    OTHER: 	    LABS:	 	        CKMB: < 1.00 ng/mL (05-03 @ 20:30)                          11.2   6.16  )-----------( 247      ( 04 May 2019 06:20 )             34.0     05-04    139  |  103  |  14  ----------------------------<  108<H>  4.0   |  22  |  0.69    Ca    9.0      04 May 2019 06:20  Phos  4.4     05-04  Mg     1.9     05-04    TPro  7.9  /  Alb  4.5  /  TBili  0.3  /  DBili  x   /  AST  19  /  ALT  19  /  AlkPhos  82  05-03    PT/INR - ( 03 May 2019 15:59 )   PT: 11.7 SEC;   INR: 1.05          PTT - ( 03 May 2019 15:59 )  PTT:33.3 SEC
CC: no complaints    TELEMETRY:     PHYSICAL EXAM:    T(C): 36.7 (05-05-19 @ 06:20), Max: 36.8 (05-04-19 @ 20:40)  HR: 66 (05-05-19 @ 06:20) (66 - 76)  BP: 109/67 (05-05-19 @ 06:20) (100/64 - 115/68)  RR: 18 (05-05-19 @ 06:20) (15 - 18)  SpO2: 99% (05-05-19 @ 06:20) (99% - 100%)  Wt(kg): --  I&O's Summary      Appearance: Normal	  Cardiovascular: Normal S1 S2,RRR, No JVD, No murmurs  Respiratory: Lungs clear to auscultation	  Gastrointestinal:  Soft, Non-tender, + BS	  Extremities: Normal range of motion, No clubbing, cyanosis or edema  Vascular: Peripheral pulses palpable 2+ bilaterally     LABS:	 	                          11.5   6.48  )-----------( 262      ( 05 May 2019 05:59 )             34.3     05-05    139  |  104  |  12  ----------------------------<  112<H>  4.0   |  25  |  0.77    Ca    9.8      05 May 2019 05:59  Phos  4.2     05-05  Mg     1.9     05-05    TPro  7.9  /  Alb  4.5  /  TBili  0.3  /  DBili  x   /  AST  19  /  ALT  19  /  AlkPhos  82  05-03      PT/INR - ( 03 May 2019 15:59 )   PT: 11.7 SEC;   INR: 1.05          PTT - ( 03 May 2019 15:59 )  PTT:33.3 SEC    CARDIAC MARKERS:
CHIEF COMPLAINT:Patient is a 43y old  Female who presents with a chief complaint of Chest pain x 2 days (04 May 2019 14:28)    	        PAST MEDICAL & SURGICAL HISTORY:  Nicotine addiction  Bipolar illness  Fibroids  S/P neck surgery, follow-up exam: lump removed  S/P appendectomy          REVIEW OF SYSTEMS:  CONSTITUTIONAL: No fever, weight loss, or fatigue  EYES: No eye pain, visual disturbances, or discharge  NECK: No pain or stiffness  RESPIRATORY: No cough, wheezing, chills or hemoptysis; No Shortness of Breath  CARDIOVASCULAR: No chest pain, palpitations, passing out, dizziness, or leg swelling  GASTROINTESTINAL: No abdominal or epigastric pain. No nausea, vomiting, or hematemesis; No diarrhea or constipation. No melena or hematochezia.  GENITOURINARY: No dysuria, frequency, hematuria, or incontinence  NEUROLOGICAL: No headaches, memory loss, loss of strength, numbness, or tremors  SKIN: No itching, burning, rashes, or lesions   LYMPH Nodes: No enlarged glands  ENDOCRINE: No heat or cold intolerance; No hair loss  MUSCULOSKELETAL: No joint pain or swelling; No muscle, back, or extremity pain    Medications:  MEDICATIONS  (STANDING):  aspirin enteric coated 81 milliGRAM(s) Oral daily  heparin  Injectable 5000 Unit(s) SubCutaneous every 12 hours  influenza   Vaccine 0.5 milliLiter(s) IntraMuscular once  lamoTRIgine  milliGRAM(s) Oral at bedtime  sodium chloride 0.9% lock flush 3 milliLiter(s) IV Push every 8 hours    MEDICATIONS  (PRN):    	    PHYSICAL EXAM:  T(C): 36.7 (05-05-19 @ 06:20), Max: 36.8 (05-04-19 @ 20:40)  HR: 66 (05-05-19 @ 06:20) (66 - 76)  BP: 109/67 (05-05-19 @ 06:20) (100/64 - 115/68)  RR: 18 (05-05-19 @ 06:20) (15 - 18)  SpO2: 99% (05-05-19 @ 06:20) (99% - 100%)  Wt(kg): --  I&O's Summary      Appearance: Normal	  HEENT:   Normal oral mucosa, PERRL, EOMI	  Lymphatic: No lymphadenopathy  Cardiovascular: Normal S1 S2, No JVD, No murmurs, No edema  Respiratory: Lungs clear to auscultation	  Psychiatry: A & O x 3, Mood & affect appropriate  Gastrointestinal:  Soft, Non-tender, + BS	  Skin: No rashes, No ecchymoses, No cyanosis	  Neurologic: Non-focal  Extremities: Normal range of motion, No clubbing, cyanosis or edema  Vascular: Peripheral pulses palpable 2+ bilaterally    TELEMETRY: 	    ECG:  	  RADIOLOGY:  OTHER: 	  	  LABS:	 	    CARDIAC MARKERS:  CARDIAC MARKERS ( 03 May 2019 20:30 )  x     / x     / 90 u/L / < 1.00 ng/mL / x                                    11.5   6.48  )-----------( 262      ( 05 May 2019 05:59 )             34.3     05-05    139  |  104  |  12  ----------------------------<  112<H>  4.0   |  25  |  0.77    Ca    9.8      05 May 2019 05:59  Phos  4.2     05-05  Mg     1.9     05-05    TPro  7.9  /  Alb  4.5  /  TBili  0.3  /  DBili  x   /  AST  19  /  ALT  19  /  AlkPhos  82  05-03    proBNP:   Lipid Profile:   HgA1c:   TSH:

## 2019-05-05 NOTE — DISCHARGE NOTE PROVIDER - NSDCCPCAREPLAN_GEN_ALL_CORE_FT
PRINCIPAL DISCHARGE DIAGNOSIS  Diagnosis: Chest pain  Assessment and Plan of Treatment: echo and stress test normal study no evidence of ischemia or infarction, normal function      SECONDARY DISCHARGE DIAGNOSES  Diagnosis: Bipolar illness  Assessment and Plan of Treatment: continue lamtrigine    Diagnosis: Nicotine addiction  Assessment and Plan of Treatment: smoking cessation

## 2019-05-05 NOTE — DISCHARGE NOTE PROVIDER - HOSPITAL COURSE
43F with + smoker hx of fibroids s/p hysterectomy, bipolar d/o admitted with chest pain associated with dizziness and near syncope        1. Atypical CP    - no evidence of ACS, chest xray unremarkable     - ECHO: normal study normal LV/RV function     - Exercise Nuclear Stress normal study no evidence of ischemia or infarction      -asa    -smoking cessation            2.  Bipolar D/O    lamoTRIgine as ordered         Patient stable and cleared for discharge home d/w Dr Ramos.

## 2019-05-05 NOTE — DISCHARGE NOTE NURSING/CASE MANAGEMENT/SOCIAL WORK - NSDCDPATPORTLINK_GEN_ALL_CORE
You can access the [facility] Patient Portal, offered by [facility], by registering with the following website: [email or web address]

## 2019-05-05 NOTE — DISCHARGE NOTE PROVIDER - CARE PROVIDER_API CALL
Zaire Ramos)  Cardiology; Internal Medicine  [address], [address]  [place] [address]  Phone: [phone]  Fax: [phone]  Follow Up Time:     Yamil Perez)  Internal Medicine; Pulmonary Disease  [address] [place] [address]  Phone: [phone]  Fax: [phone]  Follow Up Time:

## 2019-05-06 PROBLEM — F31.9 BIPOLAR DISORDER, UNSPECIFIED: Chronic | Status: ACTIVE | Noted: 2019-05-03

## 2019-05-06 PROBLEM — F17.200 NICOTINE DEPENDENCE, UNSPECIFIED, UNCOMPLICATED: Chronic | Status: ACTIVE | Noted: 2019-05-03

## 2019-05-07 ENCOUNTER — NON-APPOINTMENT (OUTPATIENT)
Age: 44
End: 2019-05-07

## 2019-05-07 ENCOUNTER — RECORD ABSTRACTING (OUTPATIENT)
Age: 44
End: 2019-05-07

## 2019-05-07 ENCOUNTER — APPOINTMENT (OUTPATIENT)
Dept: INTERNAL MEDICINE | Facility: CLINIC | Age: 44
End: 2019-05-07
Payer: COMMERCIAL

## 2019-05-07 VITALS
HEART RATE: 74 BPM | DIASTOLIC BLOOD PRESSURE: 76 MMHG | WEIGHT: 195 LBS | SYSTOLIC BLOOD PRESSURE: 111 MMHG | HEIGHT: 65 IN | BODY MASS INDEX: 32.49 KG/M2

## 2019-05-07 DIAGNOSIS — S09.90XA UNSPECIFIED INJURY OF HEAD, INITIAL ENCOUNTER: ICD-10-CM

## 2019-05-07 DIAGNOSIS — Z88.9 ALLERGY STATUS TO UNSPECIFIED DRUGS, MEDICAMENTS AND BIOLOGICAL SUBSTANCES: ICD-10-CM

## 2019-05-07 DIAGNOSIS — E55.9 VITAMIN D DEFICIENCY, UNSPECIFIED: ICD-10-CM

## 2019-05-07 DIAGNOSIS — F41.9 ANXIETY DISORDER, UNSPECIFIED: ICD-10-CM

## 2019-05-07 PROCEDURE — 93000 ELECTROCARDIOGRAM COMPLETE: CPT

## 2019-05-07 PROCEDURE — 99213 OFFICE O/P EST LOW 20 MIN: CPT | Mod: 25

## 2019-05-07 RX ORDER — LAMOTRIGINE 200 MG/1
200 TABLET, ORALLY DISINTEGRATING ORAL
Refills: 0 | Status: ACTIVE | COMMUNITY

## 2019-05-07 NOTE — PHYSICAL EXAM
[No Acute Distress] : no acute distress [Well Nourished] : well nourished [Well Developed] : well developed [Normal Sclera/Conjunctiva] : normal sclera/conjunctiva [Normal Outer Ear/Nose] : the outer ears and nose were normal in appearance [No JVD] : no jugular venous distention [Supple] : supple [No Respiratory Distress] : no respiratory distress  [Clear to Auscultation] : lungs were clear to auscultation bilaterally [No Accessory Muscle Use] : no accessory muscle use [Normal Rate] : normal rate  [Normal S1, S2] : normal S1 and S2 [Regular Rhythm] : with a regular rhythm [de-identified] : no c spine tenderness or pain in neck wwith rotation

## 2019-05-07 NOTE — HISTORY OF PRESENT ILLNESS
[FreeTextEntry1] : [facility] chest pains at times got dizzy  also felt tingling in left hand fingers  no def asso sob  had nl troponins and ekgs  the pains last just momentarily and have continued into today.  she denies any neck pain  she is on only lamictal and denies any new stresses  she denies heartburn  but she has had some  burning

## 2019-05-08 ENCOUNTER — EMERGENCY (EMERGENCY)
Facility: HOSPITAL | Age: 44
LOS: 1 days | Discharge: HOME | End: 2019-05-08
Attending: EMERGENCY MEDICINE
Payer: COMMERCIAL

## 2019-05-08 VITALS
OXYGEN SATURATION: 96 % | SYSTOLIC BLOOD PRESSURE: 150 MMHG | HEART RATE: 84 BPM | TEMPERATURE: 98 F | RESPIRATION RATE: 16 BRPM | DIASTOLIC BLOOD PRESSURE: 106 MMHG

## 2019-05-08 DIAGNOSIS — Z09 ENCOUNTER FOR FOLLOW-UP EXAMINATION AFTER COMPLETED TREATMENT FOR CONDITIONS OTHER THAN MALIGNANT NEOPLASM: Chronic | ICD-10-CM

## 2019-05-08 DIAGNOSIS — Z98.89 OTHER SPECIFIED POSTPROCEDURAL STATES: Chronic | ICD-10-CM

## 2019-05-08 LAB
ALBUMIN SERPL ELPH-MCNC: 4.2 G/DL — SIGNIFICANT CHANGE UP (ref 3.3–5)
ALP SERPL-CCNC: 72 U/L — SIGNIFICANT CHANGE UP (ref 40–120)
ANION GAP SERPL CALC-SCNC: 13 MMOL/L — SIGNIFICANT CHANGE UP (ref 5–17)
BASOPHILS # BLD AUTO: 0.1 K/UL — SIGNIFICANT CHANGE UP (ref 0–0.2)
BASOPHILS NFR BLD AUTO: 0.7 % — SIGNIFICANT CHANGE UP (ref 0–2)
BILIRUB SERPL-MCNC: 0.2 MG/DL — SIGNIFICANT CHANGE UP (ref 0.2–1.2)
BUN SERPL-MCNC: 15 MG/DL — SIGNIFICANT CHANGE UP (ref 7–23)
CALCIUM SERPL-MCNC: 9.4 MG/DL — SIGNIFICANT CHANGE UP (ref 8.4–10.5)
CHLORIDE SERPL-SCNC: 103 MMOL/L — SIGNIFICANT CHANGE UP (ref 96–108)
CO2 SERPL-SCNC: 22 MMOL/L — SIGNIFICANT CHANGE UP (ref 22–31)
CREAT SERPL-MCNC: 0.95 MG/DL — SIGNIFICANT CHANGE UP (ref 0.5–1.3)
EOSINOPHIL # BLD AUTO: 0.1 K/UL — SIGNIFICANT CHANGE UP (ref 0–0.5)
EOSINOPHIL NFR BLD AUTO: 1.4 % — SIGNIFICANT CHANGE UP (ref 0–6)
GLUCOSE SERPL-MCNC: 104 MG/DL — HIGH (ref 70–99)
HCG SERPL-ACNC: <2 MIU/ML — SIGNIFICANT CHANGE UP
HCT VFR BLD CALC: 36 % — SIGNIFICANT CHANGE UP (ref 34.5–45)
HGB BLD-MCNC: 12.3 G/DL — SIGNIFICANT CHANGE UP (ref 11.5–15.5)
LYMPHOCYTES # BLD AUTO: 2.6 K/UL — SIGNIFICANT CHANGE UP (ref 1–3.3)
LYMPHOCYTES # BLD AUTO: 35.3 % — SIGNIFICANT CHANGE UP (ref 13–44)
MCHC RBC-ENTMCNC: 32 PG — SIGNIFICANT CHANGE UP (ref 27–34)
MCHC RBC-ENTMCNC: 34.1 GM/DL — SIGNIFICANT CHANGE UP (ref 32–36)
MCV RBC AUTO: 93.6 FL — SIGNIFICANT CHANGE UP (ref 80–100)
MONOCYTES # BLD AUTO: 0.6 K/UL — SIGNIFICANT CHANGE UP (ref 0–0.9)
MONOCYTES NFR BLD AUTO: 8.4 % — SIGNIFICANT CHANGE UP (ref 2–14)
NEUTROPHILS # BLD AUTO: 4 K/UL — SIGNIFICANT CHANGE UP (ref 1.8–7.4)
NEUTROPHILS NFR BLD AUTO: 54.1 % — SIGNIFICANT CHANGE UP (ref 43–77)
PLATELET # BLD AUTO: 295 K/UL — SIGNIFICANT CHANGE UP (ref 150–400)
POTASSIUM SERPL-MCNC: 4.8 MMOL/L — SIGNIFICANT CHANGE UP (ref 3.5–5.3)
POTASSIUM SERPL-SCNC: 4.8 MMOL/L — SIGNIFICANT CHANGE UP (ref 3.5–5.3)
PROT SERPL-MCNC: 7.3 G/DL — SIGNIFICANT CHANGE UP (ref 6–8.3)
RBC # BLD: 3.84 M/UL — SIGNIFICANT CHANGE UP (ref 3.8–5.2)
RBC # FLD: 11.7 % — SIGNIFICANT CHANGE UP (ref 10.3–14.5)
SODIUM SERPL-SCNC: 138 MMOL/L — SIGNIFICANT CHANGE UP (ref 135–145)
TROPONIN T, HIGH SENSITIVITY RESULT: <6 NG/L — SIGNIFICANT CHANGE UP (ref 0–51)
WBC # BLD: 7.4 K/UL — SIGNIFICANT CHANGE UP (ref 3.8–10.5)
WBC # FLD AUTO: 7.4 K/UL — SIGNIFICANT CHANGE UP (ref 3.8–10.5)

## 2019-05-08 PROCEDURE — 71046 X-RAY EXAM CHEST 2 VIEWS: CPT | Mod: 26

## 2019-05-08 PROCEDURE — 99284 EMERGENCY DEPT VISIT MOD MDM: CPT | Mod: 25

## 2019-05-08 PROCEDURE — 99284 EMERGENCY DEPT VISIT MOD MDM: CPT

## 2019-05-08 NOTE — ED PROVIDER NOTE - OBJECTIVE STATEMENT
44yo F pmhx bipolar disorder on lamotrigine p/w CC left sided numbness. Pt. explains since last week she has been experiencing occasional sharp shooting chest pain, along with left sided facial tingling, and left upper and lower extremity tingling. She was admitted to [facility] last Wednesday and has an extensive cardiac workup which was normal. Pt. denies fever chills SOB n/v/d abd pain urinary symptoms.

## 2019-05-08 NOTE — ED ADULT TRIAGE NOTE - CHIEF COMPLAINT QUOTE
recently discharged from hospital with c/o CP, numbness, tingling   cardiologist referred patient to neurologist out patient  patient states increased numbness and pain to L side of face and head since Monday recently discharged from hospital with c/o CP, numbness, tingling   saw cardiologist after discharge from hospital who referred patient to neurologist out patient  patient states increased numbness and pain to L side of face and head since Monday

## 2019-05-08 NOTE — ED PROVIDER NOTE - ATTENDING CONTRIBUTION TO CARE
cp to l arm now w tingling to face and other complaints  neuro exam - full strenght to LUE = RUE. sensory intact to light touch, nl radial pulse  symptoms not cw dissection / acs / pe / will consider further testing in cdu

## 2019-05-08 NOTE — ED PROVIDER NOTE - CLINICAL SUMMARY MEDICAL DECISION MAKING FREE TEXT BOX
44yo F pmhx bipolar disorder p/w CC occasional chest pain and left sided tingling ongoing since last week - will consult neuro, CTA head and neck, send basic labs, trop although low suspicion for ACS and had recent negative cardiac workup. Peripheral pulses equal. Pt. without chest pain at this time. Dissection extremely unlikely. Will order CXR.

## 2019-05-09 VITALS
DIASTOLIC BLOOD PRESSURE: 69 MMHG | TEMPERATURE: 98 F | RESPIRATION RATE: 17 BRPM | SYSTOLIC BLOOD PRESSURE: 102 MMHG | HEART RATE: 68 BPM | OXYGEN SATURATION: 99 %

## 2019-05-09 LAB
ALT FLD-CCNC: <5 U/L — LOW (ref 10–45)
AST SERPL-CCNC: 15 U/L — SIGNIFICANT CHANGE UP (ref 10–40)
CHOLEST SERPL-MCNC: 181 MG/DL — SIGNIFICANT CHANGE UP (ref 10–199)
HBA1C BLD-MCNC: 5.6 % — SIGNIFICANT CHANGE UP (ref 4–5.6)
HDLC SERPL-MCNC: 55 MG/DL — SIGNIFICANT CHANGE UP
LIPID PNL WITH DIRECT LDL SERPL: 117 MG/DL — HIGH
TOTAL CHOLESTEROL/HDL RATIO MEASUREMENT: 3.3 RATIO — SIGNIFICANT CHANGE UP (ref 3.3–7.1)
TRIGL SERPL-MCNC: 47 MG/DL — SIGNIFICANT CHANGE UP (ref 10–149)

## 2019-05-09 PROCEDURE — 85027 COMPLETE CBC AUTOMATED: CPT

## 2019-05-09 PROCEDURE — 70496 CT ANGIOGRAPHY HEAD: CPT

## 2019-05-09 PROCEDURE — 70551 MRI BRAIN STEM W/O DYE: CPT | Mod: 26

## 2019-05-09 PROCEDURE — 70496 CT ANGIOGRAPHY HEAD: CPT | Mod: 26

## 2019-05-09 PROCEDURE — 84484 ASSAY OF TROPONIN QUANT: CPT

## 2019-05-09 PROCEDURE — 80061 LIPID PANEL: CPT

## 2019-05-09 PROCEDURE — 83036 HEMOGLOBIN GLYCOSYLATED A1C: CPT

## 2019-05-09 PROCEDURE — 93005 ELECTROCARDIOGRAM TRACING: CPT

## 2019-05-09 PROCEDURE — 71046 X-RAY EXAM CHEST 2 VIEWS: CPT

## 2019-05-09 PROCEDURE — 84702 CHORIONIC GONADOTROPIN TEST: CPT

## 2019-05-09 PROCEDURE — G0378: CPT

## 2019-05-09 PROCEDURE — 70498 CT ANGIOGRAPHY NECK: CPT | Mod: 26

## 2019-05-09 PROCEDURE — 70498 CT ANGIOGRAPHY NECK: CPT

## 2019-05-09 PROCEDURE — 70551 MRI BRAIN STEM W/O DYE: CPT

## 2019-05-09 PROCEDURE — 80053 COMPREHEN METABOLIC PANEL: CPT

## 2019-05-09 PROCEDURE — 99284 EMERGENCY DEPT VISIT MOD MDM: CPT | Mod: 25

## 2019-05-09 PROCEDURE — 99236 HOSP IP/OBS SAME DATE HI 85: CPT

## 2019-05-09 NOTE — ED CDU PROVIDER INITIAL DAY NOTE - MEDICAL DECISION MAKING DETAILS
42y/o F with PMH bipolar disorder on lamotrigine c/o left sided paresthesias x 1 week. Pt states she also has decreased balance, lightheadedness, and 2 episodes of presyncope. pt states CP occurs with deep breaths. pt was admitted to [facility] last Wednesday and had an extensive cardiac workup which was normal. Pt. denies fever, chills, sweats, SOB, n/v/d, abd pain, H/A, syncope, falls, slurred speech, vision changes, back pain, urinary symptoms. In ED, labs unremarkable, CTH and CTA H&N all negative. neuro c/s'd and recommended CDU for monitoring and MRI. Pt resting comfortably at this time, has no complaints. Will follow.

## 2019-05-09 NOTE — ED ADULT NURSE NOTE - CHIEF COMPLAINT QUOTE
recently discharged from hospital with c/o CP, numbness, tingling   saw cardiologist after discharge from hospital who referred patient to neurologist out patient  patient states increased numbness and pain to L side of face and head since Monday

## 2019-05-09 NOTE — ED CDU PROVIDER INITIAL DAY NOTE - OBJECTIVE STATEMENT
42y/o F with PMH bipolar disorder on lamotrigine c/o left sided paresthesias x 1 week. Pt. explains since last week she has been experiencing occasional sharp shooting chest pain, along with left sided facial/LUE/LLE tingling and numbness. states the LUE does feel slightly weaker than the RUE. pt states she also has decreased balance, lightheadedness, and 2 episodes of presyncope. pt states CP occurs with deep breaths. pt was admitted to [facility] last Wednesday and had an extensive cardiac workup which was normal. Pt. denies fever, chills, sweats, SOB, n/v/d, abd pain, H/A, syncope, falls, slurred speech, vision changes, back pain, urinary symptoms.   In ED, labs unremarkable, CTH and CTA H&N all negative. neuro c/s'd and recommended CDU for monitoring and MRI.     PMD Dr. Perez

## 2019-05-09 NOTE — ED CDU PROVIDER DISPOSITION NOTE - CARE PROVIDER_API CALL
Chaparro Cuevas)  Neurology; Vascular Neurology  [address]  [place] [address]  Phone: [phone]  Fax: [phone]  Follow Up Time:

## 2019-05-09 NOTE — ED CDU PROVIDER DISPOSITION NOTE - NSFOLLOWUPINSTRUCTIONS_ED_ALL_ED_FT
1. Continue your home medications as directed.  2. Drink plenty of fluids to stay hydrated.  3. You will need to follow up with your PMD and neurologist or our neurology clinic at [phone] in 2-3 days. A copy of your results were given to you to bring to your appt.   4. Return to ER for headache, confusion, behavior/speech changes, numbness/tingling/weakness in your arms/legs, or any other concerns. 1. Continue your home medications as directed.  2. Drink plenty of fluids to stay hydrated.  3. You will need to follow up with your PMD in 1-2 days and Neurology in 3-4 days. A copy of your results were given to you to bring to your appt.   4. Return to ER for headache, confusion, behavior/speech changes, numbness/tingling/weakness in your arms/legs, or any other concerns.

## 2019-05-09 NOTE — CONSULT NOTE ADULT - SUBJECTIVE AND OBJECTIVE BOX
Neurology  Consult Note  05-09-19    Name:  ZEESHAN ALFARO; 43y (1975)    Reason for Admission:     Chief Complaint:  Left sensory changes    HPI:  44yo AA woman with a PMHx of Bipolar Disorder on Lamotrigine presents with complaints of left arm and leg paresthesia. Patient recently discharged last week from [facility] for complaints of chest pain and numbness radiating to arm and jaw. Today, patient reports similar sensation involving arm, face, and leg. Patient reports sensation and tingling feeling. Patient states that she is also experiencing intermittent chest pain which she is describing as muscle pain. Patient reporrts sensory changes in her legs began wednesday 5/8. Patient repots intermittent symptoms. Patient currently denies fevers, chills, ns, cp, sob, abd pain, n/v/d/c, or changes to weight.   In the ED, VSS, labs grossly WNL, CTA/CTH NAD.     Review of Systems:  As states in HPI.    PMHx:   Nicotine addiction  Bipolar illness  Fibroids    PFHx:   FH: hyperlipidemia  FH: hypertension    PSuHx:   S/P neck surgery, follow-up exam  S/P appendectomy    Medications:  MEDICATIONS  (STANDING):    MEDICATIONS  (PRN):    Vitals:  T(C): 36.5 (05-08-19 @ 23:48), Max: 36.5 (05-08-19 @ 21:17)  HR: 82 (05-08-19 @ 23:48) (82 - 84)  BP: 119/76 (05-08-19 @ 23:48) (119/76 - 150/106)  RR: 17 (05-08-19 @ 23:48) (16 - 17)  SpO2: 97% (05-08-19 @ 23:48) (96% - 97%)    Labs:                        12.3   7.4   )-----------( 295      ( 08 May 2019 23:24 )             36.0     05-08    138  |  103  |  15  ----------------------------<  104<H>  4.8   |  22  |  0.95    Ca    9.4      08 May 2019 23:24    TPro  7.3  /  Alb  4.2  /  TBili  0.2  /  DBili  x   /  AST  15  /  ALT  <5<L>  /  AlkPhos  72  05-08    CAPILLARY BLOOD GLUCOSE  LIVER FUNCTIONS - ( 08 May 2019 23:24 )  Alb: 4.2 g/dL / Pro: 7.3 g/dL / ALK PHOS: 72 U/L / ALT: <5 U/L / AST: 15 U/L / GGT: x           PHYSICAL EXAMINATION:  General: Well-developed, well nourished, in no acute distress.  Eyes: Conjunctiva and sclera clear.  Neurologic:  - Mental Status:  Alert, awake, oriented to person, place, and time; Speech is fluent with intact naming, repetition, and comprehension; Good overall fund of knowledge;  - Cranial Nerves II-XII:    II:  Visual fields are full to confrontation; Pupils are equal, round, and reactive to light;.  III, IV, VI:  Extraocular movements are intact without nystagmus.  V:  Diminished sensation over L. V1 distribution.    VII:  Face is symmetric with normal eye closure and smile  VIII:  Hearing is intact to finger rub.  IX, X:  Uvula is midline and soft palate rises symmetrically  XI:  Head turning and shoulder shrug are intact.  XII:  Tongue protudes in the midline.  - Motor:  LUE and LLE drift, does not hit bed. 5/5 Elsewhere.   - Reflexes:  2+ and symmetric at the biceps, triceps, brachioradialis, knees, and ankles.  Plantar responses flexor.  - Sensory: Patchy sensory loss over lower arm distribution  - Coordination:  Finger-nose-finger and heel-knee-shin w/ mild dysmetria on left.. Rapid alternating hand movements intact.  - Gait:   Normal steps, base, arm swing, and turning.  Heel and toe walking are normal.  Tandem gait is normal.  Romberg testing is negative.    Radiology:  < from: CT Angio Neck w/ IV Cont (05.09.19 @ 01:12) >  IMPRESSION:    CT BRAIN:  No acute intracranial hemorrhage, mass effect, or CT evidence   of an acute or recent subacute transcortical infarct.    CTA NECK:  No significant stenosis of the cervical carotid or vertebral   arteries.    CTA HEAD:  No significant stenosis or occlusion of the major proximal   branches of the [place].    CARLOS RUDD M.D., ATTENDING RADIOLOGIST  This document has been electronically signed. May  9 2019  1:17AM    < end of copied text >

## 2019-05-09 NOTE — CONSULT NOTE ADULT - ASSESSMENT
42yo AA woman with a PMHx of Bipolar Disorder on Lamotrigine presents with complaints of left arm and leg paresthesia. Patient recently discharged last week from [facility] for complaints of chest pain and numbness radiating to arm and jaw. Today, patient reports similar sensation involving arm, face, and leg. Patient reports sensation and tingling feeling. Patient states that she is also experiencing intermittent chest pain which she is describing as muscle pain. Patient reporrts sensory changes in her legs began wednesday 5/8. Patient repots intermittent symptoms. Patient currently denies fevers, chills, ns, cp, sob, abd pain, n/v/d/c, or changes to weight.   In the ED, VSS, labs grossly WNL, CTA/CTH NAD.     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     Plan:  - CDU for observation  - MRI brain w/o

## 2019-05-09 NOTE — CONSULT NOTE ADULT - ATTENDING COMMENTS
Ms. Mcguire is a 44yo AA woman with a PMHx of Bipolar Disorder on Lamotrigine presents with complaints of left arm and leg paresthesia. Patient recently discharged last week from [facility] for complaints of chest pain and numbness radiating to arm and jaw.  her neurological exam is nonfocal, MRI brain reviewed, to my eye, no evidence of acute infarct or ischemia or a brain lesion that may explain her present symptoms.   Differential diagnoses includes complicated migraine  she may follow up with general neurology at [address] [facility] [place] ; [phone].

## 2019-05-09 NOTE — ED CDU PROVIDER INITIAL DAY NOTE - FAMILY HISTORY
FH: hypertension     FH: hyperlipidemia     Grandparent  Still living? Unknown  Family history of stroke, Age at diagnosis: Age Unknown

## 2019-05-09 NOTE — ED CDU PROVIDER INITIAL DAY NOTE - CRANIAL NERVE AND PUPILLARY EXAM
cranial nerves 2-12 intact/extra-ocular movements intact/4/5 strength LUE and LLE compared to 5/5 RUE & RLE.

## 2019-05-09 NOTE — ED ADULT NURSE REASSESSMENT NOTE - COMFORT CARE
plan of care explained/darkened lights
ambulated to bathroom/plan of care explained/po fluids offered

## 2019-05-09 NOTE — ED CDU PROVIDER DISPOSITION NOTE - CLINICAL COURSE
44y/o F with PMH bipolar disorder on lamotrigine c/o left sided paresthesias x 1 week. Pt. explains since last week she has been experiencing occasional sharp shooting chest pain, along with left sided facial/LUE/LLE tingling and numbness. states the LUE does feel slightly weaker than the RUE. pt states she also has decreased balance, lightheadedness, and 2 episodes of presyncope. pt states CP occurs with deep breaths. pt was admitted to [facility] last Wednesday and had an extensive cardiac workup which was normal. Pt. denies fever, chills, sweats, SOB, n/v/d, abd pain, H/A, syncope, falls, slurred speech, vision changes, back pain, urinary symptoms.   In ED, labs unremarkable, CTH and CTA H&N all negative. neuro c/s'd and recommended CDU for monitoring and MRI.   In CDU, _____________ 44y/o F with PMH bipolar disorder on lamotrigine c/o left sided paresthesias x 1 week. Pt. explains since last week she has been experiencing occasional sharp shooting chest pain, along with left sided facial/LUE/LLE tingling and numbness. states the LUE does feel slightly weaker than the RUE. pt states she also has decreased balance, lightheadedness, and 2 episodes of presyncope. pt states CP occurs with deep breaths. pt was admitted to [facility] last Wednesday and had an extensive cardiac workup which was normal. Pt. denies fever, chills, sweats, SOB, n/v/d, abd pain, H/A, syncope, falls, slurred speech, vision changes, back pain, urinary symptoms.   In ED, labs unremarkable, CTH and CTA H&N all negative. neuro c/s'd and recommended CDU for monitoring and MRI.   In CDU, pt did well, symptoms improved, MRI- no stroke. a few non-specific white matter changes. as per Neuro attending, pt can f/up in office.

## 2019-05-09 NOTE — ED CDU PROVIDER INITIAL DAY NOTE - PROGRESS NOTE DETAILS
CDU NOTE ANA BAI: Pt resting comfortably, feeling well. states she only has tingling in L hand now. tingling/numbness in L face, arm, and leg resolved. NAD, VSS. No events on telemetry. neuro exam: 4/5 strength LUE compared to 5/5 RUE, no other deficits; strength 5/5 LEs b/l, sensation equal/intact throughout. will continue monitoring. Attending MD Gabriel:  I have personally performed a face to face diagnostic evaluation on this patient.  I have reviewed the ACP note and agree with the history, exam, and plan of care, except as noted.  44y/o F with PMH bipolar disorder on lamotrigine c/o left sided paresthesias x 1 week. Pt states she also has decreased balance, lightheadedness, and 2 episodes of presyncope. pt states CP occurs with deep breaths. pt was admitted to [facility] last Wednesday and had an extensive cardiac workup which was normal. Pt. denies fever, chills, sweats, SOB, n/v/d, abd pain, H/A, syncope, falls, slurred speech, vision changes, back pain, urinary symptoms. In ED, labs unremarkable, CTH and CTA H&N all negative. neuro c/s'd and recommended CDU for monitoring and MRI. Pt resting comfortably at this time, has no complaints. Will follow.

## 2019-05-09 NOTE — ED ADULT NURSE NOTE - OBJECTIVE STATEMENT
Pt is a 43 year old female hx of bipolar disorder,  received ambualtory A&OX4 complaining of left sided chest pain with associated numbness/weakness to L arm. PT states that she has been experiencing this left sided chest pain intermittently since last week along with left sided facial tingling. Pt states that she was admitted to [facility] and d/c to follow up with cardiology. PT followed up with cardiologist today and was told to make an appointment with neurologist as out patient, so she came to [facility]. Pt numbness has worsened since Monday.

## 2019-05-09 NOTE — ED ADULT NURSE REASSESSMENT NOTE - NS ED NURSE REASSESS COMMENT FT1
12.30 Pt was Reviewed by CDU MD  with all the results . Pt is feeling better . Pt Lianna CP SOB N/V/D fever chills has stable vitals ANA [place]  gave the discharge Summary & explained the follow up care  Pt verbalized the understanding on follow up care  Pt stable to go home
CDU eval at bedside
neuro at bedside
pending CDU eval
pt pending CT scan and neuro consult
Pt received from RE[facility] Ley. Pt oriented to CDU & plan of care was discussed. Pt A&O x 4. Pt in CDU for MRI in am, neuro check q 4 and cardiac monitoring. Pt denies any chest pain, SOB, dizziness or palpitations as of now. Pt neuro check is intact, except pt has decreased  strength in left hand, pt also states she has tingling to left side of face and left arm. Pt on a cardiac monitor in NSR, HR in 60's. Safety & comfort measures maintained. Call bell in reach. Will continue to monitor.
07.00 Pt received from REBE[facility] Hurd  Pt is observed for left sided  paresthesia for MRI   07.30 Am  Pt is  reassessed . Pt oriented to CDU & plan of care was discussed. Pt A&O x 4.  Pt denies any chest pain, SOB, dizziness headache  or palpitations as of now. Pt feels better with tingling & numbness in left side  Pt on a cardiac monitor in NSR, HR in 70's. Pt resting in bed. Safety & comfort measures maintained. IV site looks clean & dry No signs of infiltration noted  Pt is advised to call for help if needed   Call bell in reach. Pt verbalized the understanding on the same  Will continue to monitor. Pt went for MRI  awaiting results

## 2019-05-14 ENCOUNTER — APPOINTMENT (OUTPATIENT)
Dept: INTERNAL MEDICINE | Facility: CLINIC | Age: 44
End: 2019-05-14

## 2019-05-29 ENCOUNTER — APPOINTMENT (OUTPATIENT)
Dept: INTERNAL MEDICINE | Facility: CLINIC | Age: 44
End: 2019-05-29

## 2019-06-24 ENCOUNTER — APPOINTMENT (OUTPATIENT)
Dept: INTERNAL MEDICINE | Facility: CLINIC | Age: 44
End: 2019-06-24
Payer: COMMERCIAL

## 2019-06-24 VITALS
WEIGHT: 190 LBS | BODY MASS INDEX: 31.65 KG/M2 | SYSTOLIC BLOOD PRESSURE: 125 MMHG | HEIGHT: 65 IN | HEART RATE: 81 BPM | DIASTOLIC BLOOD PRESSURE: 84 MMHG

## 2019-06-24 DIAGNOSIS — M79.7 FIBROMYALGIA: ICD-10-CM

## 2019-06-24 PROCEDURE — 99213 OFFICE O/P EST LOW 20 MIN: CPT

## 2019-06-24 NOTE — REVIEW OF SYSTEMS
[Joint Pain] : no joint pain [Joint Stiffness] : no joint stiffness [Muscle Weakness] : no muscle weakness [Joint Swelling] : no joint swelling [Muscle Pain] : muscle pain [Negative] : Cardiovascular [FreeTextEntry9] : as above [de-identified] : as above

## 2019-06-24 NOTE — PHYSICAL EXAM
[No Acute Distress] : no acute distress [Well Nourished] : well nourished [Well Developed] : well developed [Normal Outer Ear/Nose] : the outer ears and nose were normal in appearance [Normal Sclera/Conjunctiva] : normal sclera/conjunctiva [Normal Oropharynx] : the oropharynx was normal [Normal TMs] : both tympanic membranes were normal [No JVD] : no jugular venous distention [Supple] : supple [No Respiratory Distress] : no respiratory distress  [Clear to Auscultation] : lungs were clear to auscultation bilaterally [No Accessory Muscle Use] : no accessory muscle use [Regular Rhythm] : with a regular rhythm [Normal S1, S2] : normal S1 and S2 [Normal Rate] : normal rate  [Normal Gait] : normal gait [Coordination Grossly Intact] : coordination grossly intact

## 2019-06-24 NOTE — HISTORY OF PRESENT ILLNESS
[FreeTextEntry1] : she is having  body aches mainly back shoulder and legs  she says everyday is something new. she saw neuro and had nc testing and blood testing which did not reveal any def neuro abnormality  she had had brain and spine mris as well which were not revealung

## 2019-07-23 ENCOUNTER — TRANSCRIPTION ENCOUNTER (OUTPATIENT)
Age: 44
End: 2019-07-23

## 2019-08-30 ENCOUNTER — APPOINTMENT (OUTPATIENT)
Dept: RHEUMATOLOGY | Facility: CLINIC | Age: 44
End: 2019-08-30
Payer: COMMERCIAL

## 2019-08-30 VITALS
WEIGHT: 200 LBS | HEIGHT: 65 IN | BODY MASS INDEX: 33.32 KG/M2 | SYSTOLIC BLOOD PRESSURE: 100 MMHG | HEART RATE: 85 BPM | DIASTOLIC BLOOD PRESSURE: 60 MMHG

## 2019-08-30 DIAGNOSIS — Z82.61 FAMILY HISTORY OF ARTHRITIS: ICD-10-CM

## 2019-08-30 PROCEDURE — 36415 COLL VENOUS BLD VENIPUNCTURE: CPT

## 2019-08-30 PROCEDURE — 99245 OFF/OP CONSLTJ NEW/EST HI 55: CPT | Mod: 25

## 2019-08-30 RX ORDER — CHOLECALCIFEROL (VITAMIN D3) 1250 MCG
1.25 MG CAPSULE ORAL WEEKLY
Refills: 0 | Status: COMPLETED | COMMUNITY
End: 2019-08-30

## 2019-08-30 NOTE — DATA REVIEWED
[FreeTextEntry1] : CBC, CMP unremarkable\par SHEYLA negative\par dsDNA negative\par SSA/SSB negative\par TSH WNL\par ACE WNL\par \par MRI C-spine: unremarkable\par MRI L-spine:  unremarkable

## 2019-08-30 NOTE — PHYSICAL EXAM
[General Appearance - Alert] : alert [General Appearance - In No Acute Distress] : in no acute distress [Sclera] : the sclera and conjunctiva were normal [Outer Ear] : the ears and nose were normal in appearance [Oropharynx] : the oropharynx was normal [Neck Appearance] : the appearance of the neck was normal [Neck Cervical Mass (___cm)] : no neck mass was observed [Jugular Venous Distention Increased] : there was no jugular-venous distention [Thyroid Diffuse Enlargement] : the thyroid was not enlarged [Thyroid Nodule] : there were no palpable thyroid nodules [Auscultation Breath Sounds / Voice Sounds] : lungs were clear to auscultation bilaterally [Heart Sounds] : normal S1 and S2 [Heart Rate And Rhythm] : heart rate was normal and rhythm regular [Heart Sounds Gallop] : no gallops [Murmurs] : no murmurs [Heart Sounds Pericardial Friction Rub] : no pericardial rub [Edema] : there was no peripheral edema [Bowel Sounds] : normal bowel sounds [Abdomen Soft] : soft [Abdomen Tenderness] : non-tender [Abdomen Mass (___ Cm)] : no abdominal mass palpated [Cervical Lymph Nodes Enlarged Posterior Bilaterally] : posterior cervical [Cervical Lymph Nodes Enlarged Anterior Bilaterally] : anterior cervical [Supraclavicular Lymph Nodes Enlarged Bilaterally] : supraclavicular [No Spinal Tenderness] : no spinal tenderness [FreeTextEntry1] : No synovitis, full ROM in all joints\par No synovitis, full ROM in all joints\par  [Skin Color & Pigmentation] : normal skin color and pigmentation [] : no rash [Skin Turgor] : normal skin turgor [No Focal Deficits] : no focal deficits [Oriented To Time, Place, And Person] : oriented to person, place, and time [Impaired Insight] : insight and judgment were intact [Affect] : the affect was normal

## 2019-08-30 NOTE — REVIEW OF SYSTEMS
[Feeling Poorly] : feeling poorly [Feeling Tired] : feeling tired [As Noted in HPI] : as noted in HPI [Negative] : Endocrine [FreeTextEntry4] : (+)dry mouth

## 2019-08-30 NOTE — ASSESSMENT
How Severe Are Your Spot(S)?: mild [FreeTextEntry1] : 44 year old female presents with diffuse pain and persistent fatigue.  Her presentation is most suggestive of fibromyalgia.  However, as this is a diagnosis of exclusion, I have ordered some bloodwork to rule out other possible etiologies.  In addition, she reports that she is scheduled for a skin biopsy as workup for a small fiber neuropathy, which according to the literature is suspected to be associated with fibromyalgia in some cases. She will follow up with me again once her results are available.  In the meantime, I have recommended that she try to perform light exercise. What Type Of Note Output Would You Prefer (Optional)?: Standard Output What Is The Reason For Today's Visit?: Full Body Skin Examination What Is The Reason For Today's Visit? (Being Monitored For X): the development of new lesions

## 2019-08-30 NOTE — HISTORY OF PRESENT ILLNESS
[FreeTextEntry1] : 44 year old female with PMHx as listed below reports that she was in her USOH until May, when she was hospitalized for chest pain, dizziness, near-syncope, numbness/tingling left and and jaw.  Cardiac w/u was negative and she was D/C'ed home.  1-2 weeks later, the symptoms continued and she was re-admitted.  She underwent neuro w/u which was also unremarkable.  She followed up w/ neuro as an outpatient, who performed further w/u, including autoimmune disease, Lyme, which was reportedly negative.  EMG was also reportedly normal.  \par She then began to experience diffuse achiness, including her arms, legs, and chest.  The pain is intermittent, typically worse at night and in the mornings.  The pain is moderate - 4-5 out of 10.  She denies any joint swelling or AM stiffness.  She tried taking Tylenol and Advil, but neither helped. \par The chest pain occurs even at rest.  No radiation.  She describes the pain as "achy tightness"  No change with position.  Non-pleuritic.   Also w/ (+)frequent headaches.\par She was also reportedly found to have an M-spike, so she was referred to [facility], who found Bence-Aleman protein, and diagnosed her w/ MGUS.\par No F/C, no unintentional weight loss, no night sweats, no oral ulcers, no rashes, no alopecia, no photosensitivity, no dry eyes; (+)dry mouth, no Raynaud symptoms, no focal weakness, no dysphagia  [Weight Loss] : no weight loss [Anorexia] : no anorexia [Fever] : no fever [Malaise] : no malaise [Chills] : no chills [Malar Facial Rash] : no malar facial rash [Skin Lesions] : no lesions [Skin Nodules] : no skin nodules [Oral Ulcers] : no oral ulcers [Cough] : no cough

## 2019-08-30 NOTE — CONSULT LETTER
[Dear  ___] : Dear  [unfilled], [Consult Letter:] : I had the pleasure of evaluating your patient, [unfilled]. [Consult Closing:] : Thank you very much for allowing me to participate in the care of this patient.  If you have any questions, please do not hesitate to contact me. [Please see my note below.] : Please see my note below. [Sincerely,] : Sincerely, [FreeTextEntry3] : Natan Bourne MD\par Rheumatology\par [facility]\par  of Medicine\par [employer] [facility] \par \par [address]\[place] [address]\par phone:  [phone]\par fax:      [phone]\par \par [address] [place] [address]\[place] [address]\par phone:  [phone]\par fax:      [phone]\par

## 2019-09-03 LAB
ALBUMIN SERPL ELPH-MCNC: 4.8 G/DL
ALP BLD-CCNC: 88 U/L
ALT SERPL-CCNC: 16 U/L
ANION GAP SERPL CALC-SCNC: 18 MMOL/L
AST SERPL-CCNC: 19 U/L
BASOPHILS # BLD AUTO: 0.04 K/UL
BASOPHILS NFR BLD AUTO: 0.5 %
BILIRUB SERPL-MCNC: 0.5 MG/DL
BUN SERPL-MCNC: 11 MG/DL
CALCIUM SERPL-MCNC: 9.7 MG/DL
CHLORIDE SERPL-SCNC: 103 MMOL/L
CK SERPL-CCNC: 132 U/L
CO2 SERPL-SCNC: 20 MMOL/L
CREAT SERPL-MCNC: 0.76 MG/DL
CRP SERPL-MCNC: 0.75 MG/DL
DEPRECATED KAPPA LC FREE/LAMBDA SER: 0.22 RATIO
EOSINOPHIL # BLD AUTO: 0.05 K/UL
EOSINOPHIL NFR BLD AUTO: 0.6 %
ERYTHROCYTE [SEDIMENTATION RATE] IN BLOOD BY WESTERGREN METHOD: 19 MM/HR
GLUCOSE SERPL-MCNC: 94 MG/DL
HCT VFR BLD CALC: 39.5 %
HGB BLD-MCNC: 12.9 G/DL
IGA SER QL IEP: 134 MG/DL
IGG SER QL IEP: 1354 MG/DL
IGM SER QL IEP: 87 MG/DL
IMM GRANULOCYTES NFR BLD AUTO: 0.2 %
KAPPA LC CSF-MCNC: 5.64 MG/DL
KAPPA LC SERPL-MCNC: 1.24 MG/DL
LYMPHOCYTES # BLD AUTO: 2.33 K/UL
LYMPHOCYTES NFR BLD AUTO: 26.5 %
MAN DIFF?: NORMAL
MCHC RBC-ENTMCNC: 31.5 PG
MCHC RBC-ENTMCNC: 32.7 GM/DL
MCV RBC AUTO: 96.3 FL
MONOCYTES # BLD AUTO: 0.58 K/UL
MONOCYTES NFR BLD AUTO: 6.6 %
NEUTROPHILS # BLD AUTO: 5.77 K/UL
NEUTROPHILS NFR BLD AUTO: 65.6 %
PLATELET # BLD AUTO: 353 K/UL
POTASSIUM SERPL-SCNC: 4.2 MMOL/L
PROT SERPL-MCNC: 8.1 G/DL
RBC # BLD: 4.1 M/UL
RBC # FLD: 13.1 %
SODIUM SERPL-SCNC: 140 MMOL/L
WBC # FLD AUTO: 8.79 K/UL

## 2019-09-27 ENCOUNTER — APPOINTMENT (OUTPATIENT)
Dept: RHEUMATOLOGY | Facility: CLINIC | Age: 44
End: 2019-09-27
Payer: COMMERCIAL

## 2019-09-27 VITALS
DIASTOLIC BLOOD PRESSURE: 70 MMHG | HEIGHT: 65 IN | SYSTOLIC BLOOD PRESSURE: 110 MMHG | BODY MASS INDEX: 33.32 KG/M2 | WEIGHT: 200 LBS

## 2019-09-27 DIAGNOSIS — R76.8 OTHER SPECIFIED ABNORMAL IMMUNOLOGICAL FINDINGS IN SERUM: ICD-10-CM

## 2019-09-27 DIAGNOSIS — R52 PAIN, UNSPECIFIED: ICD-10-CM

## 2019-09-27 DIAGNOSIS — R07.89 OTHER CHEST PAIN: ICD-10-CM

## 2019-09-27 DIAGNOSIS — R53.83 OTHER FATIGUE: ICD-10-CM

## 2019-09-27 DIAGNOSIS — R68.2 DRY MOUTH, UNSPECIFIED: ICD-10-CM

## 2019-09-27 PROCEDURE — 99214 OFFICE O/P EST MOD 30 MIN: CPT

## 2019-09-27 NOTE — ASSESSMENT
[FreeTextEntry1] : 44 year old female presented with diffuse pain and persistent fatigue, most suggestive of fibromyalgia.  Pain currently much improved though still with persistent fatigue.  Also previously w/ chest pain - was most c/w musculoskeletal etiology.  Now resolved.  Dry mouth also resolved.  Pt also found to have elevated lambda light chains w/ decreased kappa/lambda ratio - she reports that she has seen heme who diagnosed her with MGUS\par   - Reiterated importance of exercise, jomar stretching, aerobics, aquatic.\par   - ibuprofen prn\par   - sleep hygiene.\par

## 2019-09-27 NOTE — HISTORY OF PRESENT ILLNESS
[Anorexia] : no anorexia [FreeTextEntry1] : Feeling better overall since last visit.  Her diffuse pain has improved -now does not occur as often.  Chest pain has resolved.  No new complaints. [Weight Loss] : no weight loss [Malaise] : no malaise [Fever] : no fever [Chills] : no chills [Malar Facial Rash] : no malar facial rash [Skin Lesions] : no lesions [Oral Ulcers] : no oral ulcers [Skin Nodules] : no skin nodules [Cough] : no cough

## 2019-09-27 NOTE — PHYSICAL EXAM
[General Appearance - Alert] : alert [General Appearance - In No Acute Distress] : in no acute distress [Sclera] : the sclera and conjunctiva were normal [Outer Ear] : the ears and nose were normal in appearance [Oropharynx] : the oropharynx was normal [Neck Appearance] : the appearance of the neck was normal [Neck Cervical Mass (___cm)] : no neck mass was observed [Jugular Venous Distention Increased] : there was no jugular-venous distention [Thyroid Diffuse Enlargement] : the thyroid was not enlarged [Thyroid Nodule] : there were no palpable thyroid nodules [Auscultation Breath Sounds / Voice Sounds] : lungs were clear to auscultation bilaterally [Heart Rate And Rhythm] : heart rate was normal and rhythm regular [Heart Sounds] : normal S1 and S2 [Heart Sounds Gallop] : no gallops [Murmurs] : no murmurs [Heart Sounds Pericardial Friction Rub] : no pericardial rub [Edema] : there was no peripheral edema [Bowel Sounds] : normal bowel sounds [Abdomen Soft] : soft [Abdomen Tenderness] : non-tender [Abdomen Mass (___ Cm)] : no abdominal mass palpated [Cervical Lymph Nodes Enlarged Posterior Bilaterally] : posterior cervical [Supraclavicular Lymph Nodes Enlarged Bilaterally] : supraclavicular [Cervical Lymph Nodes Enlarged Anterior Bilaterally] : anterior cervical [No Spinal Tenderness] : no spinal tenderness [Skin Color & Pigmentation] : normal skin color and pigmentation [Skin Turgor] : normal skin turgor [] : no rash [Oriented To Time, Place, And Person] : oriented to person, place, and time [No Focal Deficits] : no focal deficits [Impaired Insight] : insight and judgment were intact [Affect] : the affect was normal [FreeTextEntry1] : No synovitis, full ROM in all joints\par No synovitis, full ROM in all joints\par

## 2020-01-10 ENCOUNTER — APPOINTMENT (OUTPATIENT)
Dept: RHEUMATOLOGY | Facility: CLINIC | Age: 45
End: 2020-01-10

## 2020-03-14 ENCOUNTER — TRANSCRIPTION ENCOUNTER (OUTPATIENT)
Age: 45
End: 2020-03-14

## 2020-12-14 NOTE — ED ADULT NURSE NOTE - EXTENSIONS OF SELF_ADULT
None [de-identified] : Pt presented for PE.  Last PE was 1 year ago.  \par \par Pt was exposed to COVID, and was tested positive at urgent care center 1 month ago.  She had the rapid test.  She was well, and never got sick.  She had COVID antibody test with her blood test for today.\par \par His/Her health was uneventful since last visit, he/she has no new complaint.\par \par Pt will get flu vaccine at work tomorrow.

## 2021-03-22 ENCOUNTER — APPOINTMENT (OUTPATIENT)
Dept: INTERNAL MEDICINE | Facility: CLINIC | Age: 46
End: 2021-03-22

## 2022-06-06 ENCOUNTER — APPOINTMENT (OUTPATIENT)
Dept: ORTHOPEDIC SURGERY | Facility: CLINIC | Age: 47
End: 2022-06-06
Payer: COMMERCIAL

## 2022-06-06 VITALS — BODY MASS INDEX: 32.82 KG/M2 | WEIGHT: 197 LBS | HEIGHT: 65 IN

## 2022-06-06 DIAGNOSIS — S93.492A SPRAIN OF OTHER LIGAMENT OF LEFT ANKLE, INITIAL ENCOUNTER: ICD-10-CM

## 2022-06-06 PROCEDURE — 73610 X-RAY EXAM OF ANKLE: CPT | Mod: LT

## 2022-06-06 PROCEDURE — L4350: CPT

## 2022-06-06 PROCEDURE — 99214 OFFICE O/P EST MOD 30 MIN: CPT

## 2022-06-06 NOTE — PHYSICAL EXAM
[Left] : left foot and ankle [5___] : plantar flexion 5[unfilled]/5 [2+] : dorsalis pedis pulse: 2+ [] : patient ambulates without assistive device [de-identified] : plantar flexion 20 degrees [[id number]] : dorsiflexion 5 degrees

## 2022-06-06 NOTE — IMAGING
[Left] : left ankle [There are no fractures, subluxations or dislocations. No significant abnormalities are seen] : There are no fractures, subluxations or dislocations. No significant abnormalities are seen

## 2022-06-06 NOTE — HISTORY OF PRESENT ILLNESS
[Sudden] : sudden [7] : 7 [5] : 5 [Dull/Aching] : dull/aching [Localized] : localized [Full time] : Work status: full time [de-identified] : 6/6/22: inversion injury yesterday w/ ankle pain. no tx to date. no prior ankle probs. no dm/tob. oversees afterschool programs [] : Post Surgical Visit: no [FreeTextEntry1] : L Ankle [FreeTextEntry3] : 6/5/22

## 2022-06-27 ENCOUNTER — APPOINTMENT (OUTPATIENT)
Dept: ORTHOPEDIC SURGERY | Facility: CLINIC | Age: 47
End: 2022-06-27

## 2022-11-09 ENCOUNTER — NON-APPOINTMENT (OUTPATIENT)
Age: 47
End: 2022-11-09

## 2022-12-26 ENCOUNTER — NON-APPOINTMENT (OUTPATIENT)
Age: 47
End: 2022-12-26

## 2022-12-28 ENCOUNTER — NON-APPOINTMENT (OUTPATIENT)
Age: 47
End: 2022-12-28

## 2022-12-29 ENCOUNTER — APPOINTMENT (OUTPATIENT)
Dept: INTERNAL MEDICINE | Facility: CLINIC | Age: 47
End: 2022-12-29

## 2022-12-29 VITALS
HEIGHT: 65 IN | HEART RATE: 71 BPM | DIASTOLIC BLOOD PRESSURE: 90 MMHG | WEIGHT: 213 LBS | BODY MASS INDEX: 35.49 KG/M2 | SYSTOLIC BLOOD PRESSURE: 139 MMHG | OXYGEN SATURATION: 96 % | TEMPERATURE: 97.8 F

## 2022-12-29 VITALS — SYSTOLIC BLOOD PRESSURE: 140 MMHG | DIASTOLIC BLOOD PRESSURE: 100 MMHG

## 2022-12-29 DIAGNOSIS — F17.200 NICOTINE DEPENDENCE, UNSPECIFIED, UNCOMPLICATED: ICD-10-CM

## 2022-12-29 DIAGNOSIS — I10 ESSENTIAL (PRIMARY) HYPERTENSION: ICD-10-CM

## 2022-12-29 DIAGNOSIS — E66.9 OBESITY, UNSPECIFIED: ICD-10-CM

## 2022-12-29 DIAGNOSIS — F31.9 BIPOLAR DISORDER, UNSPECIFIED: ICD-10-CM

## 2022-12-29 DIAGNOSIS — D47.2 MONOCLONAL GAMMOPATHY: ICD-10-CM

## 2022-12-29 PROCEDURE — 99203 OFFICE O/P NEW LOW 30 MIN: CPT

## 2022-12-29 RX ORDER — VALSARTAN 160 MG/1
160 TABLET, COATED ORAL DAILY
Qty: 90 | Refills: 3 | Status: ACTIVE | COMMUNITY
Start: 2022-12-29 | End: 1900-01-01

## 2022-12-29 NOTE — PHYSICAL EXAM
[No Acute Distress] : no acute distress [Normal Outer Ear/Nose] : the outer ears and nose were normal in appearance [No JVD] : no jugular venous distention [Supple] : supple [No Respiratory Distress] : no respiratory distress  [No Accessory Muscle Use] : no accessory muscle use [Clear to Auscultation] : lungs were clear to auscultation bilaterally [Normal Rate] : normal rate  [Regular Rhythm] : with a regular rhythm [Normal S1, S2] : normal S1 and S2 [Soft] : abdomen soft [Non-distended] : non-distended [No HSM] : no HSM [Normal Bowel Sounds] : normal bowel sounds [Coordination Grossly Intact] : coordination grossly intact [Normal Gait] : normal gait [Normal] : affect was normal and insight and judgment were intact [No Edema] : there was no peripheral edema

## 2022-12-29 NOTE — HISTORY OF PRESENT ILLNESS
[FreeTextEntry1] : told her bp was  high in an urgent care 2 days ago  when  she had a resp illness.  she received prednisone for a prio episode but is off that and was not taking otc cold rx  prior to the bp measurement remains on lamictal for mood which works well.letha michele has frequent headaches  she says she has a lot of stress.she has  gained  13 lbs over the last 3 years.  she stopped smoking.  she sees [facility] for mgus.

## 2023-01-23 ENCOUNTER — APPOINTMENT (OUTPATIENT)
Dept: INTERNAL MEDICINE | Facility: CLINIC | Age: 48
End: 2023-01-23

## 2023-01-30 NOTE — H&P ADULT - REASON FOR ADMISSION
Chest pain x 2 days Cosentyx Counseling:  I discussed with the patient the risks of Cosentyx including but not limited to worsening of Crohn's disease, immunosuppression, allergic reactions and infections.  The patient understands that monitoring is required including a PPD at baseline and must alert us or the primary physician if symptoms of infection or other concerning signs are noted.

## 2023-05-05 ENCOUNTER — APPOINTMENT (OUTPATIENT)
Dept: INTERNAL MEDICINE | Facility: CLINIC | Age: 48
End: 2023-05-05

## 2023-07-26 ENCOUNTER — EMERGENCY (EMERGENCY)
Facility: HOSPITAL | Age: 48
LOS: 0 days | Discharge: HOME | End: 2023-07-26
Payer: COMMERCIAL

## 2023-07-26 VITALS
DIASTOLIC BLOOD PRESSURE: 91 MMHG | TEMPERATURE: 98 F | OXYGEN SATURATION: 99 % | RESPIRATION RATE: 15 BRPM | SYSTOLIC BLOOD PRESSURE: 159 MMHG | HEART RATE: 64 BPM

## 2023-07-26 VITALS
HEIGHT: 65 IN | TEMPERATURE: 98 F | RESPIRATION RATE: 17 BRPM | HEART RATE: 76 BPM | WEIGHT: 205.03 LBS | DIASTOLIC BLOOD PRESSURE: 98 MMHG | OXYGEN SATURATION: 96 % | SYSTOLIC BLOOD PRESSURE: 158 MMHG

## 2023-07-26 DIAGNOSIS — Z87.891 PERSONAL HISTORY OF NICOTINE DEPENDENCE: ICD-10-CM

## 2023-07-26 DIAGNOSIS — F31.9 BIPOLAR DISORDER, UNSPECIFIED: ICD-10-CM

## 2023-07-26 DIAGNOSIS — Z09 ENCOUNTER FOR FOLLOW-UP EXAMINATION AFTER COMPLETED TREATMENT FOR CONDITIONS OTHER THAN MALIGNANT NEOPLASM: Chronic | ICD-10-CM

## 2023-07-26 DIAGNOSIS — R03.0 ELEVATED BLOOD-PRESSURE READING, WITHOUT DIAGNOSIS OF HYPERTENSION: ICD-10-CM

## 2023-07-26 DIAGNOSIS — Z90.710 ACQUIRED ABSENCE OF BOTH CERVIX AND UTERUS: Chronic | ICD-10-CM

## 2023-07-26 DIAGNOSIS — Z90.710 ACQUIRED ABSENCE OF BOTH CERVIX AND UTERUS: ICD-10-CM

## 2023-07-26 DIAGNOSIS — I10 ESSENTIAL (PRIMARY) HYPERTENSION: ICD-10-CM

## 2023-07-26 DIAGNOSIS — Z98.89 OTHER SPECIFIED POSTPROCEDURAL STATES: Chronic | ICD-10-CM

## 2023-07-26 DIAGNOSIS — R11.0 NAUSEA: ICD-10-CM

## 2023-07-26 DIAGNOSIS — Z90.49 ACQUIRED ABSENCE OF OTHER SPECIFIED PARTS OF DIGESTIVE TRACT: ICD-10-CM

## 2023-07-26 DIAGNOSIS — R51.9 HEADACHE, UNSPECIFIED: ICD-10-CM

## 2023-07-26 DIAGNOSIS — Z87.42 PERSONAL HISTORY OF OTHER DISEASES OF THE FEMALE GENITAL TRACT: ICD-10-CM

## 2023-07-26 LAB — HCG SERPL-ACNC: <1 MIU/ML — SIGNIFICANT CHANGE UP

## 2023-07-26 PROCEDURE — 99284 EMERGENCY DEPT VISIT MOD MDM: CPT

## 2023-07-26 RX ORDER — LAMOTRIGINE 25 MG/1
0 TABLET, ORALLY DISINTEGRATING ORAL
Qty: 90 | Refills: 0 | DISCHARGE

## 2023-07-26 RX ORDER — SODIUM CHLORIDE 9 MG/ML
2000 INJECTION INTRAMUSCULAR; INTRAVENOUS; SUBCUTANEOUS ONCE
Refills: 0 | Status: COMPLETED | OUTPATIENT
Start: 2023-07-26 | End: 2023-07-26

## 2023-07-26 RX ORDER — METOCLOPRAMIDE HCL 10 MG
10 TABLET ORAL ONCE
Refills: 0 | Status: COMPLETED | OUTPATIENT
Start: 2023-07-26 | End: 2023-07-26

## 2023-07-26 RX ADMIN — Medication 10 MILLIGRAM(S): at 13:34

## 2023-07-26 RX ADMIN — SODIUM CHLORIDE 2000 MILLILITER(S): 9 INJECTION INTRAMUSCULAR; INTRAVENOUS; SUBCUTANEOUS at 13:34

## 2023-07-26 NOTE — ED ADULT NURSE NOTE - NSICDXFAMILYHX_GEN_ALL_CORE_FT
FAMILY HISTORY:  FH: hyperlipidemia  FH: hypertension    Grandparent  Still living? Unknown  Family history of stroke, Age at diagnosis: Age Unknown

## 2023-07-26 NOTE — ED PROVIDER NOTE - NSFOLLOWUPINSTRUCTIONS_ED_ALL_ED_FT
Hypertension  Hypertension is another name for high blood pressure. High blood pressure forces your heart to work harder to pump blood. This can cause problems over time.    There are two numbers in a blood pressure reading. There is a top number (systolic) over a bottom number (diastolic). It is best to have a blood pressure below 120/80. Healthy choices can help lower your blood pressure. You may need medicine to help lower your blood pressure if:    Your blood pressure cannot be lowered with healthy choices.  Your blood pressure is higher than 130/80.    Follow these instructions at home:  Eating and drinking     If directed, follow the DASH eating plan. This diet includes:    Filling half of your plate at each meal with fruits and vegetables.  Filling one quarter of your plate at each meal with whole grains. Whole grains include whole wheat pasta, brown rice, and whole grain bread.  Eating or drinking low-fat dairy products, such as skim milk or low-fat yogurt.  Filling one quarter of your plate at each meal with low-fat (lean) proteins. Low-fat proteins include fish, skinless chicken, eggs, beans, and tofu.  Avoiding fatty meat, cured and processed meat, or chicken with skin.  Avoiding premade or processed food.    Eat less than 1,500 mg of salt (sodium) a day.  ImageLimit alcohol use to no more than 1 drink a day for nonpregnant women and 2 drinks a day for men. One drink equals 12 oz of beer, 5 oz of wine, or 1½ oz of hard liquor.  Lifestyle     Work with your doctor to stay at a healthy weight or to lose weight. Ask your doctor what the best weight is for you.  Get at least 30 minutes of exercise that causes your heart to beat faster (aerobic exercise) most days of the week. This may include walking, swimming, or biking.  Get at least 30 minutes of exercise that strengthens your muscles (resistance exercise) at least 3 days a week. This may include lifting weights or pilates.  Do not use any products that contain nicotine or tobacco. This includes cigarettes and e-cigarettes. If you need help quitting, ask your doctor.  Check your blood pressure at home as told by your doctor.  Keep all follow-up visits as told by your doctor. This is important.  Medicines     Take over-the-counter and prescription medicines only as told by your doctor. Follow directions carefully.  Do not skip doses of blood pressure medicine. The medicine does not work as well if you skip doses. Skipping doses also puts you at risk for problems.  Ask your doctor about side effects or reactions to medicines that you should watch for.  Contact a doctor if:  You think you are having a reaction to the medicine you are taking.  You have headaches that keep coming back (recurring).  You feel dizzy.  You have swelling in your ankles.  You have trouble with your vision.  Get help right away if:  You get a very bad headache.  You start to feel confused.  You feel weak or numb.  You feel faint.  You get very bad pain in your:    Chest.  Belly (abdomen).    You throw up (vomit) more than once.  You have trouble breathing.  Summary  Hypertension is another name for high blood pressure.  Making healthy choices can help lower blood pressure. If your blood pressure cannot be controlled with healthy choices, you may need to take medicine.  This information is not intended to replace advice given to you by your health care provider. Make sure you discuss any questions you have with your health care provider.

## 2023-07-26 NOTE — ED PROVIDER NOTE - PATIENT PORTAL LINK FT
You can access the FollowMyHealth Patient Portal offered by [facility] by registering at the following website: [email or web address]. By joining [employer]’s FollowMyHealth portal, you will also be able to view your health information using other applications (apps) compatible with our system.

## 2023-07-26 NOTE — ED PROVIDER NOTE - PROVIDER TOKENS
FREE:[LAST:[your pmd in 1-3 days],PHONE:[(   )    -],FAX:[(   )    -]],PROVIDER:[TOKEN:[[id number]],FOLLOWUP:[1-3 Days]]

## 2023-07-26 NOTE — ED ADULT NURSE NOTE - NSICDXPASTSURGICALHX_GEN_ALL_CORE_FT
PAST SURGICAL HISTORY:  H/O: hysterectomy     S/P appendectomy     S/P neck surgery, follow-up exam lump removed

## 2023-07-26 NOTE — ED ADULT NURSE NOTE - NS ED NURSE LEVEL OF CONSCIOUSNESS MENTAL STATUS
[Normal] : affect appropriate [de-identified] : normal respiration [de-identified] : skinny legs, loss of muscle Awake/Alert/Cooperative

## 2023-07-26 NOTE — ED ADULT NURSE NOTE - NSICDXPASTMEDICALHX_GEN_ALL_CORE_FT
PAST MEDICAL HISTORY:  Bipolar illness     Fibroids     HTN (hypertension)     Nicotine addiction

## 2023-07-26 NOTE — ED PROVIDER NOTE - PHYSICAL EXAMINATION
PHYSICAL EXAM:    GENERAL: Alert, appears stated age, well appearing, non-toxic  SKIN: Warm, and dry.  HEAD: NC, AT  EYE: Normal lids/conjunctiva, PERRL, EOMI  ENT: Normal hearing, patent oropharynx  NECK: +supple. No meningismus, or JVD  Pulm: Bilateral BS, normal resp effort, no wheezes, stridor, or retractions  CV: RRR, no M/R/G, 2+and = radial pulses  Abd: soft, non-tender, non-distended  Mskel: no erythema, cyanosis, edema. no calf tenderness  Neuro: AAOx3, no sensory/motor deficits, CN 2-12 intact. No speech slurring, pronator drift, facial asymmetry. normal finger-to-nose b/l. 5/5 strength throughout. normal gait. negative romberg.

## 2023-07-26 NOTE — ED PROVIDER NOTE - CLINICAL SUMMARY MEDICAL DECISION MAKING FREE TEXT BOX
hypertension, resolved, no signs of end organ damage.   offered head CT, pt declined. neuro exam wnl.   Reviewed all results and necessity for follow up. Counseled on red flags and to return for them.  Patient appears well on discharge.

## 2023-07-26 NOTE — ED ADULT NURSE NOTE - OBJECTIVE STATEMENT
patient is A&Ox4. Breathing unlabored on RA. PMH HTN. psh appendectomy, hysterectomy 2017. Patient complaining of high BP at home x3 days. states the highest BP reading at home was 175/115. reports taking valsartan 160mg at bedtime everyday. patient complaint with BP meds. patient complaining of intermittent headaches x3 days. c/o pressure like sensation in head. denies any nausea, vomiting, diarrhea, cp, urinary symptoms, abd pain, fever, chills, dizziness, vison changes, sob, difficulty breathing, . denies any other symptoms. denies any blood thinners.

## 2023-07-26 NOTE — ED ADULT TRIAGE NOTE - CHIEF COMPLAINT QUOTE
Patient comes to ED c/o headaches, nausea and high BP for the last 3 days. Highest reading patient seen was 175/115. Patient has been compliant with valsartan 160mg at bedtime. Patient took 2 aspirins this morning with no relief. Denies blurry vision or chest pain.  hx HTN

## 2023-07-26 NOTE — ED PROVIDER NOTE - OBJECTIVE STATEMENT
46 y/o F with PMH HTN which was recently diagnosed on valsartan noncompliant with that, presents with concern for elevation in blood pressure for the last 3 days the highest being 170s/110s.  She also endorses mild headache, nausea without vomiting.   No chest pain, shortness of breath, back pain, abdominal pain, focal weakness, paresthesia, difficulty ambulating, urinary symptoms.  denies that peak of headache occurred <1 hr, neck pain/trauma, difference from this HA to previous HA, worst HA life, syncope, paraesthesias, jaw pain, dental pain, tearing, sinus pain.

## 2023-08-06 ENCOUNTER — NON-APPOINTMENT (OUTPATIENT)
Age: 48
End: 2023-08-06

## 2023-08-15 ENCOUNTER — NON-APPOINTMENT (OUTPATIENT)
Age: 48
End: 2023-08-15

## 2024-07-13 NOTE — H&P ADULT - SKIN/BREAST
CARDIOLOGY CONSULT PLACED  @ [id number] 7/9/24  
Consult dictated # [id number]    
Sheyla Byrd  7/13/2024  [id number]    Chief Complaint: Arrhythmia    Subjective:   This is a 89-year-old female with a past medical history including:  Past Medical History:   Diagnosis Date    Amaurosis fugax of left eye 3/15/2011    Benign neoplasm     colon    Cancer of kidney (HCC) 11/03/2010    Right    Colonic polyp 1999    Cyst of ovary, left 9/2/2012    Dehydration 2/9/2011    Diverticulosis of colon 1999    Dyslipidemia 2008    GERD (gastroesophageal reflux disease)     Glaucoma     Hyperlipidemia     Hypertension 1960    IBS (irritable bowel syndrome)     Osteoporosis     Other specified gastritis without mention of hemorrhage 1998    Pericardial effusion 8/23/2011     Who came into the hospital with cardiac arrhythmia.  She was found to have bradycardia transient heart plaque, acute urinary retention, delirium, hypertension, and hypothyroidism.  Previous to this she was mod independent.  Currently she is limited in therapy and likely will need intensive therapy before planned discharge home.  She is agreeable to come to the rehab unit when medically stable.    ROS: No CP, SOB, dyspnea    Objective:  Patient Vitals for the past 24 hrs:   BP Temp Temp src Pulse Resp SpO2   07/13/24 0945 (!) 146/73 98.3 °F (36.8 °C) Oral 78 18 92 %   07/13/24 0419 (!) 149/69 98.5 °F (36.9 °C) Oral 80 18 91 %   07/13/24 0015 136/77 98.1 °F (36.7 °C) Axillary 74 18 91 %   07/12/24 2000 125/63 98.2 °F (36.8 °C) Oral 78 18 92 %   07/12/24 1540 (!) 152/76 97.6 °F (36.4 °C) Axillary 83 18 93 %   07/12/24 1309 137/69 -- -- 81 -- 94 %   07/12/24 1115 (!) 154/67 97.3 °F (36.3 °C) Oral 75 16 94 %     Gen: No distress, pleasant.   HEENT: Normocephalic, atraumatic.   CV: No audible murmurs, well perfused extremities  Resp: No respiratory distress. No increased WOB  Abd: Soft, nontender nondistended  Ext: No edema.   Neuro: Alert,  appropriately interactive.     Laboratory data: Available via EMR.     Therapy 
daily, Prilosec 20 mg p.o. daily, Zocor 20 mg p.o. daily.    ALLERGIES:  SHE REPORTS ALLERGIES TO AVELOX, BENADRYL, CLARITHROMYCIN, CODEINE, DOXYCYCLINE, MORPHINE, PENICILLIN, PROPOXYPHENE, SULFA ANTIBIOTICS, TETRACYCLINES, AND TRAMADOL.      SOCIAL HISTORY:  The patient does not smoke.  She does not consume alcohol at this time.    FAMILY HISTORY:  Unremarkable for cardiac rhythm disturbance, syncope, or sudden cardiac death.    REVIEW OF SYSTEMS:  Demonstrates no recent change in appetite or change in weight.  The patient has not had recent fever or chills.  She does report a sinus infection, for which she was treated with antibiotics recently.  She does not describe near-syncope or syncope.  She has not had chest pain.  All other components of a 14-system review are negative.    PHYSICAL EXAMINATION:  VITAL SIGNS:  Blood pressure is 170/73, heart rate is 92 beats per minute and regular.  GENERAL:  The patient is awake, alert, oriented, and in no discomfort.  HEENT:  Demonstrates normocephalic and atraumatic head.  There is no scleral icterus.  Pupils are round and reactive.  NECK:  Supple without thyromegaly.  LUNGS:  Clear to auscultation bilaterally.  CARDIOVASCULAR:  Reveals a regular rate and rhythm.  The apical impulse is discrete.  S1 and S2 are normal.  There is a slight ejection-type systolic murmur at the left upper sternal border.  The jugular venous pressure is about 4 cm of water.  ABDOMEN:  Obese, soft, and nontender.  EXTREMITIES: Demonstrate no pitting pretibial dependent edema.  There is no cyanosis.  There is no evidence for chronic venous stasis.  SKIN:  Otherwise warm and dry without skin rash.    DIAGNOSTIC DATA:  12-lead ECG from 07/09/2024 demonstrates sinus rhythm at 87 beats per minute with 1:1 AV conduction and left bundle-branch block.    IMPRESSIONS:    1. Transient complete heart block.  2. Left bundle-branch block.  3. Chronic large pericardial effusion.  4. Renal 
negative

## 2024-11-06 ENCOUNTER — NON-APPOINTMENT (OUTPATIENT)
Age: 49
End: 2024-11-06

## 2025-03-27 ENCOUNTER — NON-APPOINTMENT (OUTPATIENT)
Age: 50
End: 2025-03-27

## 2025-08-27 ENCOUNTER — NON-APPOINTMENT (OUTPATIENT)
Age: 50
End: 2025-08-27